# Patient Record
Sex: FEMALE | Race: WHITE | Employment: STUDENT | URBAN - NONMETROPOLITAN AREA
[De-identification: names, ages, dates, MRNs, and addresses within clinical notes are randomized per-mention and may not be internally consistent; named-entity substitution may affect disease eponyms.]

---

## 2019-06-14 ENCOUNTER — OFFICE VISIT (OUTPATIENT)
Dept: FAMILY MEDICINE CLINIC | Facility: CLINIC | Age: 19
End: 2019-06-14
Payer: COMMERCIAL

## 2019-06-14 VITALS
TEMPERATURE: 100 F | HEART RATE: 114 BPM | RESPIRATION RATE: 16 BRPM | HEIGHT: 65 IN | BODY MASS INDEX: 18.87 KG/M2 | OXYGEN SATURATION: 99 % | SYSTOLIC BLOOD PRESSURE: 112 MMHG | WEIGHT: 113.25 LBS | DIASTOLIC BLOOD PRESSURE: 72 MMHG

## 2019-06-14 DIAGNOSIS — J01.00 ACUTE NON-RECURRENT MAXILLARY SINUSITIS: ICD-10-CM

## 2019-06-14 DIAGNOSIS — R00.0 TACHYCARDIA: ICD-10-CM

## 2019-06-14 DIAGNOSIS — R50.9 FEVER, UNSPECIFIED FEVER CAUSE: ICD-10-CM

## 2019-06-14 DIAGNOSIS — R00.2 PALPITATION: ICD-10-CM

## 2019-06-14 DIAGNOSIS — H10.33 ACUTE CONJUNCTIVITIS OF BOTH EYES, UNSPECIFIED ACUTE CONJUNCTIVITIS TYPE: Primary | ICD-10-CM

## 2019-06-14 PROCEDURE — 36415 COLL VENOUS BLD VENIPUNCTURE: CPT | Performed by: NURSE PRACTITIONER

## 2019-06-14 PROCEDURE — 99204 OFFICE O/P NEW MOD 45 MIN: CPT | Performed by: NURSE PRACTITIONER

## 2019-06-14 PROCEDURE — 80050 GENERAL HEALTH PANEL: CPT | Performed by: NURSE PRACTITIONER

## 2019-06-14 PROCEDURE — 86308 HETEROPHILE ANTIBODY SCREEN: CPT | Performed by: NURSE PRACTITIONER

## 2019-06-14 PROCEDURE — 93000 ELECTROCARDIOGRAM COMPLETE: CPT | Performed by: NURSE PRACTITIONER

## 2019-06-14 RX ORDER — NORETHINDRONE ACETATE, ETHINYL ESTRADIOL AND FERROUS FUMARATE 1MG-20(24)
1 KIT ORAL DAILY
COMMUNITY
Start: 2019-05-25 | End: 2021-09-22

## 2019-06-14 RX ORDER — PREDNISONE 20 MG/1
20 TABLET ORAL 2 TIMES DAILY
Qty: 10 TABLET | Refills: 0 | Status: SHIPPED | OUTPATIENT
Start: 2019-06-14 | End: 2019-06-19

## 2019-06-14 RX ORDER — AZITHROMYCIN 250 MG/1
TABLET, FILM COATED ORAL
Qty: 6 TABLET | Refills: 0 | Status: SHIPPED | OUTPATIENT
Start: 2019-06-14 | End: 2020-01-07 | Stop reason: ALTCHOICE

## 2019-06-14 NOTE — PROGRESS NOTES
HPI:   Sore Throat    This is a new problem. Episode onset: 3-4 weeks ago. The problem has been waxing and waning (had a cold initially, then got strep throat, then a yeast infection from the abx, now her eyes are bothering her). There has been no fever. Negative for neck pain. Neurological: Positive for light-headedness and headaches (she gets a lot of them anyway). Psychiatric/Behavioral: The patient is nervous/anxious (chronic anxiety).          EXAM:   /72   Pulse 114   Temp 99.5 °F (37.5 °C) ELECTROCARDIOGRAM, COMPLETE  -     CBC W/ DIFFERENTIAL    Fever, unspecified fever cause  -     MONONUCLEOSIS TEST,SCREEN (IN-HOUSE)    EKG sinus tachycardia, no arrhthymia. Likely related to fever and infection.

## 2020-01-07 ENCOUNTER — OFFICE VISIT (OUTPATIENT)
Dept: FAMILY MEDICINE CLINIC | Facility: CLINIC | Age: 20
End: 2020-01-07
Payer: COMMERCIAL

## 2020-01-07 VITALS
HEIGHT: 65 IN | DIASTOLIC BLOOD PRESSURE: 74 MMHG | TEMPERATURE: 97 F | OXYGEN SATURATION: 98 % | SYSTOLIC BLOOD PRESSURE: 110 MMHG | WEIGHT: 130 LBS | HEART RATE: 104 BPM | BODY MASS INDEX: 21.66 KG/M2

## 2020-01-07 DIAGNOSIS — G44.209 TENSION HEADACHE: ICD-10-CM

## 2020-01-07 DIAGNOSIS — R23.9 UNSPECIFIED SKIN CHANGES: ICD-10-CM

## 2020-01-07 DIAGNOSIS — G43.009 MIGRAINE WITHOUT AURA AND WITHOUT STATUS MIGRAINOSUS, NOT INTRACTABLE: Primary | ICD-10-CM

## 2020-01-07 DIAGNOSIS — F43.22 ADJUSTMENT DISORDER WITH ANXIOUS MOOD: ICD-10-CM

## 2020-01-07 PROCEDURE — 99204 OFFICE O/P NEW MOD 45 MIN: CPT | Performed by: FAMILY MEDICINE

## 2020-01-07 RX ORDER — SUMATRIPTAN 25 MG/1
25 TABLET, FILM COATED ORAL EVERY 2 HOUR PRN
Qty: 10 TABLET | Refills: 1 | Status: SHIPPED | OUTPATIENT
Start: 2020-01-07 | End: 2021-09-22

## 2020-01-07 NOTE — PATIENT INSTRUCTIONS
A total of 30 minutes was spent with the patient discussing the following. Anticipatory guidance provided as well as positive reinforcement. I discussed headaches in general and migraine versus tension headaches in particular.   I advised patient feels th

## 2020-01-07 NOTE — PROGRESS NOTES
HPI:    Patient ID: Ray Menjivar is a 22year old female.     Patient presents with:  Headache: chronic, discuss plus other issues    May have daily HAs for a week at a time, no relief from aleve  Daily HAs behind the eyes and head and neck  Other HAs confusion, decreased concentration, hallucinations, self-injury, sleep disturbance and suicidal ideas. The patient is nervous/anxious. The patient is not hyperactive.              Current Outpatient Medications   Medication Sig Dispense Refill   • SUMAtript lb (59 kg), SpO2 98 %.          ASSESSMENT/PLAN:   Migraine without aura and without status migrainosus, not intractable  (primary encounter diagnosis)  Tension headache  Adjustment disorder with anxious mood  Unspecified skin changes-vulvar  Patient Instru

## 2021-06-25 ENCOUNTER — OFFICE VISIT (OUTPATIENT)
Dept: FAMILY MEDICINE CLINIC | Facility: CLINIC | Age: 21
End: 2021-06-25
Payer: COMMERCIAL

## 2021-06-25 VITALS
TEMPERATURE: 98 F | DIASTOLIC BLOOD PRESSURE: 76 MMHG | RESPIRATION RATE: 14 BRPM | WEIGHT: 119 LBS | HEART RATE: 108 BPM | OXYGEN SATURATION: 97 % | SYSTOLIC BLOOD PRESSURE: 120 MMHG | BODY MASS INDEX: 19.83 KG/M2 | HEIGHT: 65 IN

## 2021-06-25 DIAGNOSIS — Z86.69 HISTORY OF MIGRAINE: ICD-10-CM

## 2021-06-25 DIAGNOSIS — G50.0 TRIGEMINAL NEURALGIA: Primary | ICD-10-CM

## 2021-06-25 PROCEDURE — 3078F DIAST BP <80 MM HG: CPT | Performed by: FAMILY MEDICINE

## 2021-06-25 PROCEDURE — 3008F BODY MASS INDEX DOCD: CPT | Performed by: FAMILY MEDICINE

## 2021-06-25 PROCEDURE — 3074F SYST BP LT 130 MM HG: CPT | Performed by: FAMILY MEDICINE

## 2021-06-25 PROCEDURE — 99213 OFFICE O/P EST LOW 20 MIN: CPT | Performed by: FAMILY MEDICINE

## 2021-06-25 NOTE — PROGRESS NOTES
Ike Quinonez is a 19year old female. Patient presents with:  Pain: intermit; one side or other of face; couple times a week; going on for years      HPI:   C/o intermittent facial pain on different sides, pain is in a trigeminal nerve distribution, n good mobility, Nose: turbinates clear, no dc, Throat: no erythema, pnd, or lesions  NECK: supple,no adenopathy,no bruits, no thyromegaly  LUNGS: clear to auscultation, no w/r/r  CARDIO: RRR without murmur, peripheral pulses intact  GI: good BS's,no masses,

## 2021-06-25 NOTE — PATIENT INSTRUCTIONS
I discussed the diagnosis of trigeminal neuralgia and possible contributing factors. We will start carbamazepine 100 mg daily and may increase to 100 mg twice daily as needed. I have asked patient to keep a symptom diary.   If there is no improvement with

## 2021-06-29 ENCOUNTER — TELEPHONE (OUTPATIENT)
Dept: FAMILY MEDICINE CLINIC | Facility: CLINIC | Age: 21
End: 2021-06-29

## 2021-06-29 RX ORDER — CARBAMAZEPINE 100 MG/1
100 TABLET, CHEWABLE ORAL DAILY
Qty: 30 TABLET | Refills: 0 | Status: SHIPPED | OUTPATIENT
Start: 2021-06-29 | End: 2021-07-15 | Stop reason: DRUGHIGH

## 2021-06-29 NOTE — TELEPHONE ENCOUNTER
Per 6/25/21 ofc note----we will start carbamazepine 100mg daily and may increase to 100mg BID as needed. \"    Script wasn't sent to pharmacy. IT APPEARS THAT THE 100MG DOSE ONLY COMES IN CHEW TABS??  Okay to send?
Please fill prescription as noted, please offer my apologies
Pt advised that script has been sent
Pt. Thought at last appt. Dr. Justin Langley was going to be calling in some anti-seizure meds. Nothing called in.
Sanam SARGENT

## 2021-07-14 ENCOUNTER — TELEPHONE (OUTPATIENT)
Dept: FAMILY MEDICINE CLINIC | Facility: CLINIC | Age: 21
End: 2021-07-14

## 2021-07-14 DIAGNOSIS — G50.0 TRIGEMINAL NEURALGIA: Primary | ICD-10-CM

## 2021-07-14 NOTE — TELEPHONE ENCOUNTER
Patient called to inform the doctor that her medication is not working. She is asking for a call back to this #. [phone].

## 2021-07-14 NOTE — TELEPHONE ENCOUNTER
Spoke with pt. Was seen on 6/25/21 for trigeminal neuralgia---calling with an update. Has been taking carbamazepine 100mg qam since then w/o relief. States pain has been worse since her appt.   Ofc note says she can increase to 100mg BID if needed, but she

## 2021-07-14 NOTE — TELEPHONE ENCOUNTER
I recommend the patient increase the carbamazepine 200 mg twice daily. If no response in 1 week increased to 200 mg twice daily  I would also like her to get a MRI /MRA of the brain.   This can be done at one of the [address] facilities or through [address]

## 2021-07-15 RX ORDER — CARBAMAZEPINE 200 MG/1
200 TABLET ORAL 2 TIMES DAILY
Qty: 60 TABLET | Refills: 0 | Status: SHIPPED | OUTPATIENT
Start: 2021-07-15 | End: 2021-08-11

## 2021-07-22 ENCOUNTER — TELEPHONE (OUTPATIENT)
Dept: FAMILY MEDICINE CLINIC | Facility: CLINIC | Age: 21
End: 2021-07-22

## 2021-07-22 DIAGNOSIS — G50.0 TRIGEMINAL NEURALGIA: Primary | ICD-10-CM

## 2021-07-22 NOTE — TELEPHONE ENCOUNTER
MARLINEI---Pt calls with an update after increasing her carbamazepine to 200mg BID on 7/14. States she has had 2 days w/o any pain. The other days, the pain has been about the same. Order for MRI/ MRA was placed on 7/14.   PT WILL CALL CENTRAL SCHEDULING TOD

## 2021-07-23 ENCOUNTER — PATIENT MESSAGE (OUTPATIENT)
Dept: FAMILY MEDICINE CLINIC | Facility: CLINIC | Age: 21
End: 2021-07-23

## 2021-07-23 RX ORDER — CARBAMAZEPINE 100 MG/1
100 TABLET, CHEWABLE ORAL DAILY
Qty: 30 TABLET | Refills: 0 | OUTPATIENT
Start: 2021-07-23

## 2021-07-23 NOTE — TELEPHONE ENCOUNTER
Patient may increase her carbamazepine to 300 mg twice daily. I would recommend neurology opinion from Dr. Lena Jackson all.   Please facilitate appointment

## 2021-07-24 NOTE — TELEPHONE ENCOUNTER
From: Gilbert Kim Eager  To: Shelbie Brewer.  Gemma Elaine DO  Sent: 7/23/2021 8:30 PM CDT  Subject: Prescription Question    Hi Dr. Gemma Elaine,  I just wanted to reach out to you and let you know that my lymph node on the left side of my neck has been swollen and se

## 2021-08-04 ENCOUNTER — HOSPITAL ENCOUNTER (OUTPATIENT)
Dept: MRI IMAGING | Facility: HOSPITAL | Age: 21
Discharge: HOME | End: 2021-08-04
Attending: FAMILY MEDICINE
Payer: COMMERCIAL

## 2021-08-04 DIAGNOSIS — G50.0 TRIGEMINAL NEURALGIA: ICD-10-CM

## 2021-08-04 PROCEDURE — 70544 MR ANGIOGRAPHY HEAD W/O DYE: CPT | Performed by: FAMILY MEDICINE

## 2021-08-04 PROCEDURE — 70551 MRI BRAIN STEM W/O DYE: CPT | Performed by: FAMILY MEDICINE

## 2021-08-11 RX ORDER — CARBAMAZEPINE 200 MG/1
TABLET ORAL
Qty: 60 TABLET | Refills: 0 | Status: SHIPPED | OUTPATIENT
Start: 2021-08-11 | End: 2021-09-22

## 2021-08-11 NOTE — TELEPHONE ENCOUNTER
Last refill: 7/15/21 #60 w/ 0 refills    Last OV: 6/25/21  Last labs: 2019    Future Appointments   Date Time Provider Renuka Bryant   9/15/2021  1:40 PM MD JULIETH Hilton

## 2021-09-15 ENCOUNTER — OFFICE VISIT (OUTPATIENT)
Dept: NEUROLOGY | Facility: CLINIC | Age: 21
End: 2021-09-15
Payer: COMMERCIAL

## 2021-09-15 VITALS
DIASTOLIC BLOOD PRESSURE: 78 MMHG | BODY MASS INDEX: 20 KG/M2 | RESPIRATION RATE: 17 BRPM | HEART RATE: 91 BPM | SYSTOLIC BLOOD PRESSURE: 128 MMHG | WEIGHT: 118 LBS

## 2021-09-15 DIAGNOSIS — G43.009 MIGRAINE WITHOUT AURA AND WITHOUT STATUS MIGRAINOSUS, NOT INTRACTABLE: ICD-10-CM

## 2021-09-15 DIAGNOSIS — G44.031 INTRACTABLE EPISODIC PAROXYSMAL HEMICRANIA: Primary | ICD-10-CM

## 2021-09-15 PROCEDURE — 99243 OFF/OP CNSLTJ NEW/EST LOW 30: CPT | Performed by: OTHER

## 2021-09-15 PROCEDURE — 3074F SYST BP LT 130 MM HG: CPT | Performed by: OTHER

## 2021-09-15 PROCEDURE — 3078F DIAST BP <80 MM HG: CPT | Performed by: OTHER

## 2021-09-15 RX ORDER — VERAPAMIL HYDROCHLORIDE 40 MG/1
40 TABLET ORAL 2 TIMES DAILY
Qty: 60 TABLET | Refills: 3 | Status: SHIPPED | OUTPATIENT
Start: 2021-09-15 | End: 2022-01-25

## 2021-09-15 RX ORDER — INDOMETHACIN 25 MG/1
25 CAPSULE ORAL 2 TIMES DAILY WITH MEALS
Qty: 20 CAPSULE | Refills: 5 | Status: SHIPPED | OUTPATIENT
Start: 2021-09-15 | End: 2021-11-24

## 2021-09-15 RX ORDER — UBROGEPANT 100 MG/1
TABLET ORAL
Qty: 10 TABLET | Refills: 0 | Status: SHIPPED | OUTPATIENT
Start: 2021-09-15 | End: 2021-11-03

## 2021-09-15 RX ORDER — VERAPAMIL HYDROCHLORIDE 40 MG/1
40 TABLET ORAL 2 TIMES DAILY
Qty: 60 TABLET | Refills: 0 | Status: SHIPPED | OUTPATIENT
Start: 2021-09-15 | End: 2021-09-15

## 2021-09-15 NOTE — PATIENT INSTRUCTIONS
After your visit at the [facility] office  today,  please direct any follow up questions or medication needs to the staff in our  [place] office so that your concerns may be promptly addressed.   We are available through [employer] or at the numbers below: picked up in office. • Please allow the office 2-3 business days to fill the prescription. • Patient must present photo ID at time of . PLEASE NOTE: PRESCRIPTIONS MUST BE PICKED UP PRIOR TO 3:00PM MONDAY-FRIDAY    Scheduling Tests:     If your ph submitting forms to office staff. • Form completion may require an additional fee. • A signed Release of Information (MIRANDA) must be on file before forms may be submitted. When dropping off forms, please ask the  for this paper.    • Failure

## 2021-09-15 NOTE — PROGRESS NOTES
[address]   Neurology; INITIAL CLINIC VISIT  9/15/2021, 2:15 PM     Alonzo Castellanos Patient Status:  No patient class for patient encounter    2000 MRN [id number]   Location [address] [place] [address] Attending No att. Daniela Blanton HISTORY:  Past Medical History:   Diagnosis Date   • Anxiety        PAST SURGICAL HISTORY:  History reviewed. No pertinent surgical history.     FAMILY HISTORY:  family history includes Hypertension in her father; Migraines in her father; No Known Problems sensory focal deficit  Gait and coordination OK  Reflexes symmetric and no pathologic reflexes seen          Special Test:    DIAGNOSTIC DATA:   IMAGING:  I reviewed the MRI images with the patient and pointed out that the white matter changes were noted l the information available to me at this time and supersedes any prior opinion expressed either orally or in writing.   Services rendered are only within the scope of direct medical care

## 2021-09-15 NOTE — PROGRESS NOTES
Patient reports she feels she has had TGN pain for a while, but it worsened approximately 2 months ago. Carbamazepine has decreased intensity of pain and it does not last as long, but she continues to have pain 50% of the month.

## 2021-09-22 ENCOUNTER — OFFICE VISIT (OUTPATIENT)
Dept: FAMILY MEDICINE CLINIC | Facility: CLINIC | Age: 21
End: 2021-09-22
Payer: COMMERCIAL

## 2021-09-22 VITALS
RESPIRATION RATE: 16 BRPM | HEIGHT: 65 IN | DIASTOLIC BLOOD PRESSURE: 76 MMHG | HEART RATE: 95 BPM | TEMPERATURE: 99 F | SYSTOLIC BLOOD PRESSURE: 128 MMHG | BODY MASS INDEX: 20.01 KG/M2 | WEIGHT: 120.13 LBS | OXYGEN SATURATION: 98 %

## 2021-09-22 DIAGNOSIS — Z00.00 WELL ADULT EXAM: Primary | ICD-10-CM

## 2021-09-22 PROCEDURE — 88175 CYTOPATH C/V AUTO FLUID REDO: CPT | Performed by: INTERNAL MEDICINE

## 2021-09-22 PROCEDURE — 3078F DIAST BP <80 MM HG: CPT | Performed by: INTERNAL MEDICINE

## 2021-09-22 PROCEDURE — 3008F BODY MASS INDEX DOCD: CPT | Performed by: INTERNAL MEDICINE

## 2021-09-22 PROCEDURE — 3074F SYST BP LT 130 MM HG: CPT | Performed by: INTERNAL MEDICINE

## 2021-09-22 PROCEDURE — 99395 PREV VISIT EST AGE 18-39: CPT | Performed by: INTERNAL MEDICINE

## 2021-09-22 RX ORDER — NORETHINDRONE ACETATE AND ETHINYL ESTRADIOL 1MG-20(21)
1 KIT ORAL DAILY
Qty: 3 EACH | Refills: 3 | Status: SHIPPED | OUTPATIENT
Start: 2021-09-22 | End: 2021-10-05

## 2021-09-22 NOTE — PROGRESS NOTES
HPI:   William Etienne is a 19year old female who presents for a complete physical exam. Symptoms: denies discharge, itching, burning or dysuria, periods are irregular. Patient complains of irregular periods, had a long interval and then 2 short. used    Alcohol use: Yes      Comment: rare    Drug use: Never    Occ: full time. : yes. Children: no.   Exercise: 7 times per week.   Diet: vegetarian     REVIEW OF SYSTEMS:   GENERAL: feels well otherwise  SKIN: denies any unusual skin lesions  EYE 19.99 kg/m². , recommended low fat diet and aerobic exercise 30 minutes three times weekly. The patient indicates understanding of these issues and agrees to the plan. The patient is asked to return for CPX in 1 year.

## 2021-10-05 ENCOUNTER — PATIENT MESSAGE (OUTPATIENT)
Dept: FAMILY MEDICINE CLINIC | Facility: CLINIC | Age: 21
End: 2021-10-05

## 2021-10-05 RX ORDER — NORETHINDRONE ACETATE AND ETHINYL ESTRADIOL 1MG-20(21)
1 KIT ORAL DAILY
Qty: 28 TABLET | Refills: 2 | Status: SHIPPED | OUTPATIENT
Start: 2021-10-05 | End: 2021-10-28

## 2021-10-05 NOTE — TELEPHONE ENCOUNTER
From: Phoenix Alston Eager  To: Eleni Holguin MD  Sent: 10/5/2021 3:19 PM CDT  Subject: Birth control prescription problem     Hi Dr. Kirsten Rodriguez,  I see you refilled my prescription on the 22nd.  However, I haven't received it yet, and I am supposed to start [place]

## 2021-10-20 ENCOUNTER — TELEPHONE (OUTPATIENT)
Dept: NEUROLOGY | Facility: CLINIC | Age: 21
End: 2021-10-20

## 2021-10-28 RX ORDER — NORETHINDRONE ACETATE AND ETHINYL ESTRADIOL AND FERROUS FUMARATE 1MG-20(21)
KIT ORAL
Qty: 28 TABLET | Refills: 2 | Status: SHIPPED | OUTPATIENT
Start: 2021-10-28 | End: 2022-01-24

## 2021-10-28 NOTE — TELEPHONE ENCOUNTER
LOV  09/22/2021    LAST LAB  09/22/2021    LAST RX  10/05/2021    Next OV    Future Appointments   Date Time Provider Renuka Bryant   11/24/2021 11:40 AM [facility] [address]    Name from pharmacy: Emilie Sal
40

## 2021-11-03 ENCOUNTER — PATIENT MESSAGE (OUTPATIENT)
Dept: NEUROLOGY | Facility: CLINIC | Age: 21
End: 2021-11-03

## 2021-11-03 DIAGNOSIS — G43.009 MIGRAINE WITHOUT AURA AND WITHOUT STATUS MIGRAINOSUS, NOT INTRACTABLE: ICD-10-CM

## 2021-11-03 DIAGNOSIS — G44.031 INTRACTABLE EPISODIC PAROXYSMAL HEMICRANIA: Primary | ICD-10-CM

## 2021-11-03 RX ORDER — UBROGEPANT 100 MG/1
TABLET ORAL
Qty: 10 TABLET | Refills: 0 | Status: SHIPPED | OUTPATIENT
Start: 2021-11-03 | End: 2021-12-01

## 2021-11-03 NOTE — TELEPHONE ENCOUNTER
Patient states [employer] is working for her, would like refill. Has two pills left.     Medication: ubrogepant (UBRELVY) 100 MG Oral Tab     Date of last refill: 9/16/2021 (#10/0)  [place]     Last office visit: 9/15/20

## 2021-11-03 NOTE — TELEPHONE ENCOUNTER
From: Kourtney Alatorre Eager  To: Angie Palacios MD  Sent: 11/3/2021 2:46 PM CDT  Subject: [facility] refill    Hi Dr. Justice Lindsey,  I wanted to reach out to see if I could get a refill of the [facility] prescription. It has been working, but I am down to 2 pills.

## 2021-11-24 ENCOUNTER — OFFICE VISIT (OUTPATIENT)
Dept: NEUROLOGY | Facility: CLINIC | Age: 21
End: 2021-11-24
Payer: COMMERCIAL

## 2021-11-24 VITALS
HEIGHT: 65 IN | WEIGHT: 120 LBS | HEART RATE: 66 BPM | RESPIRATION RATE: 16 BRPM | SYSTOLIC BLOOD PRESSURE: 118 MMHG | DIASTOLIC BLOOD PRESSURE: 78 MMHG | BODY MASS INDEX: 19.99 KG/M2

## 2021-11-24 DIAGNOSIS — G44.031 INTRACTABLE EPISODIC PAROXYSMAL HEMICRANIA: Primary | ICD-10-CM

## 2021-11-24 DIAGNOSIS — G43.009 MIGRAINE WITHOUT AURA AND WITHOUT STATUS MIGRAINOSUS, NOT INTRACTABLE: ICD-10-CM

## 2021-11-24 PROCEDURE — 99214 OFFICE O/P EST MOD 30 MIN: CPT | Performed by: OTHER

## 2021-11-24 PROCEDURE — 3074F SYST BP LT 130 MM HG: CPT | Performed by: OTHER

## 2021-11-24 PROCEDURE — 3078F DIAST BP <80 MM HG: CPT | Performed by: OTHER

## 2021-11-24 PROCEDURE — 3008F BODY MASS INDEX DOCD: CPT | Performed by: OTHER

## 2021-11-24 RX ORDER — DIVALPROEX SODIUM 250 MG/1
TABLET, EXTENDED RELEASE ORAL
Qty: 30 TABLET | Refills: 5 | Status: SHIPPED | OUTPATIENT
Start: 2021-11-24 | End: 2021-12-14

## 2021-11-24 RX ORDER — INDOMETHACIN 50 MG/1
CAPSULE ORAL
Qty: 1 CAPSULE | Refills: 0 | Status: SHIPPED | OUTPATIENT
Start: 2021-11-24 | End: 2021-11-29

## 2021-11-24 NOTE — PROGRESS NOTES
[facility] with [address]  1/30/2000  Primary Care Provider:  Stacy Springer.  Antolin Bernabe DO    11/24/2021  Accompanied visit:     (x) No.      19year old yo patient being seen for: exam      INTERPRETATION of RELEVANT LABS and other DATA:          Problem/s Identified this visit:   Intractable episodic paroxysmal hemicrania  (primary encounter diagnosis)  Migraine without aura and without status migrainosus, not intractable      Disc the scope of direct medical care

## 2021-11-29 RX ORDER — INDOMETHACIN 50 MG/1
CAPSULE ORAL
Qty: 60 CAPSULE | Refills: 1 | Status: SHIPPED | OUTPATIENT
Start: 2021-11-29

## 2021-12-01 DIAGNOSIS — G43.009 MIGRAINE WITHOUT AURA AND WITHOUT STATUS MIGRAINOSUS, NOT INTRACTABLE: ICD-10-CM

## 2021-12-01 DIAGNOSIS — G44.031 INTRACTABLE EPISODIC PAROXYSMAL HEMICRANIA: ICD-10-CM

## 2021-12-01 RX ORDER — UBROGEPANT 100 MG/1
TABLET ORAL
Qty: 10 TABLET | Refills: 0 | Status: SHIPPED | OUTPATIENT
Start: 2021-12-01 | End: 2021-12-31

## 2021-12-01 NOTE — TELEPHONE ENCOUNTER
Medication: ubrelvy 100mg     Date of last refill: 11/3/21 (#10/0R)  [place]     Last office visit: 11/24/21  Due back to clinic per last office note:  4 mon  Date next office visit scheduled:    Future Appointments

## 2021-12-06 ENCOUNTER — TELEPHONE (OUTPATIENT)
Dept: NEUROLOGY | Facility: CLINIC | Age: 21
End: 2021-12-06

## 2021-12-06 ENCOUNTER — PATIENT MESSAGE (OUTPATIENT)
Dept: NEUROLOGY | Facility: CLINIC | Age: 21
End: 2021-12-06

## 2021-12-06 DIAGNOSIS — R11.0 NAUSEA: Primary | ICD-10-CM

## 2021-12-06 NOTE — TELEPHONE ENCOUNTER
increase the Depakote to 500 mg at bedtime  I'll send a new script for the higher dose but you can double your current dose at night    [address]

## 2021-12-06 NOTE — TELEPHONE ENCOUNTER
From: Susan Alvarez Eager  To: Eren Page MD  Sent: 12/6/2021  2:06 PM CST  Subject: Headache frequency update    Hi Dr. Victoria Rico,  I hope you had a restful and enjoyable thanksgiving.    Just wanted to let you know I have had a headache every

## 2021-12-06 NOTE — TELEPHONE ENCOUNTER
From: Otis Nix Eager  To: Lucia Song MD  Sent: 12/6/2021 2:06 PM CST  Subject: Headache frequency update    Hi Dr. Jane Murrell,  I hope you had a restful and enjoyable thanksgiving.    Just wanted to let you know I have had a headache everyday sin

## 2021-12-07 RX ORDER — ONDANSETRON 4 MG/1
4 TABLET, FILM COATED ORAL EVERY 8 HOURS PRN
Qty: 14 TABLET | Refills: 0 | Status: SHIPPED | OUTPATIENT
Start: 2021-12-07

## 2021-12-07 NOTE — TELEPHONE ENCOUNTER
LMTCb  Tell her to increase Depakote to 500 mg HS  Augment the Ubrelvy with intake of Zofran with first dose of the day    Missy Patel MD   [facility]

## 2021-12-15 RX ORDER — DIVALPROEX SODIUM 250 MG/1
500 TABLET, EXTENDED RELEASE ORAL NIGHTLY
Qty: 60 TABLET | Refills: 5 | Status: SHIPPED | OUTPATIENT
Start: 2021-12-15

## 2021-12-15 NOTE — TELEPHONE ENCOUNTER
Medication: divalproex sodium ER 250mg     Date of last refill: 12/7/21 (#14/0R)  [place]     Last office visit: 11/24/21  Due back to clinic per last office note:  Margaret Yang  Date next office visit scheduled:    Future A

## 2022-01-24 RX ORDER — NORETHINDRONE ACETATE AND ETHINYL ESTRADIOL AND FERROUS FUMARATE 1MG-20(21)
KIT ORAL
Qty: 28 TABLET | Refills: 2 | Status: SHIPPED | OUTPATIENT
Start: 2022-01-24

## 2022-01-24 NOTE — TELEPHONE ENCOUNTER
Gynecology Medication Protocol Passed 01/23/2022 12:15 AM    PASS-PENDING LAST PAP WNL--VIA MANUAL LOOKUP    Physical or Pelvic/Breast in past 12 or next 3 mos--VIA MANUAL LOOKUP     Last refill - 10/28/21 - #28 with 2 reills  Last pap - 9/22/21   Last phy

## 2022-01-25 RX ORDER — VERAPAMIL HYDROCHLORIDE 40 MG/1
TABLET ORAL
Qty: 180 TABLET | Refills: 1 | Status: SHIPPED | OUTPATIENT
Start: 2022-01-25 | End: 2022-01-26

## 2022-01-25 NOTE — TELEPHONE ENCOUNTER
Medication: Verapamil 40 mg     Date of last refill: 09/15/21 with 3 addt refills  [place]     Last office visit: 11/24/21  Due back to clinic per last office note:  4mon  Date next office visit scheduled:

## 2022-02-03 RX ORDER — DIVALPROEX SODIUM 250 MG/1
500 TABLET, EXTENDED RELEASE ORAL NIGHTLY
Qty: 60 TABLET | Refills: 5 | OUTPATIENT
Start: 2022-02-03

## 2022-02-03 NOTE — TELEPHONE ENCOUNTER
Divalproex  mg refilled on 12/15/21 #60/5R to requesting pharmacy  Spoke with pharmacy tech, verified refills on file.     RN, please refuse as refill not currently appropriate    Pt notified via [address]

## 2022-02-21 RX ORDER — UBROGEPANT 100 MG/1
TABLET ORAL
Qty: 10 TABLET | Refills: 0 | Status: SHIPPED | OUTPATIENT
Start: 2022-02-21

## 2022-04-16 RX ORDER — NORETHINDRONE ACETATE AND ETHINYL ESTRADIOL AND FERROUS FUMARATE 1MG-20(21)
KIT ORAL
Qty: 28 TABLET | Refills: 2 | Status: SHIPPED | OUTPATIENT
Start: 2022-04-16

## 2022-04-16 NOTE — TELEPHONE ENCOUNTER
LOV 09/22/2021    LAST LAB 2019    LAST RX  Pippa Serrano FE #28 R2 01/24/2022    Next OV   Future Appointments   Date Time Provider Renuka Bryant   5/25/2022  3:00 PM MD IDA FlorIALLEN [facility] [address]   Gynecology Medication Protocol Passed 04/16/2022 12:16 AM    PASS-PENDING LAST PAP WNL--VIA MANUAL LOOKUP    Physical or Pelvic/Breast in past 12 or next 3 mos--VIA MANUAL LOOKUP

## 2022-05-02 RX ORDER — UBROGEPANT 100 MG/1
1 TABLET ORAL AS NEEDED
Qty: 10 TABLET | Refills: 5 | Status: SHIPPED | OUTPATIENT
Start: 2022-05-02

## 2022-05-25 ENCOUNTER — OFFICE VISIT (OUTPATIENT)
Dept: NEUROLOGY | Facility: CLINIC | Age: 22
End: 2022-05-25
Payer: COMMERCIAL

## 2022-05-25 VITALS — RESPIRATION RATE: 16 BRPM | HEIGHT: 65 IN | WEIGHT: 120 LBS | BODY MASS INDEX: 19.99 KG/M2

## 2022-05-25 DIAGNOSIS — R11.0 NAUSEA: ICD-10-CM

## 2022-05-25 DIAGNOSIS — G44.031 INTRACTABLE EPISODIC PAROXYSMAL HEMICRANIA: ICD-10-CM

## 2022-05-25 DIAGNOSIS — G43.009 MIGRAINE WITHOUT AURA AND WITHOUT STATUS MIGRAINOSUS, NOT INTRACTABLE: Primary | ICD-10-CM

## 2022-05-25 PROCEDURE — 3008F BODY MASS INDEX DOCD: CPT | Performed by: OTHER

## 2022-05-25 PROCEDURE — 99214 OFFICE O/P EST MOD 30 MIN: CPT | Performed by: OTHER

## 2022-05-25 RX ORDER — DIVALPROEX SODIUM 250 MG/1
500 TABLET, EXTENDED RELEASE ORAL NIGHTLY
Qty: 60 TABLET | Refills: 5 | Status: SHIPPED | OUTPATIENT
Start: 2022-05-25

## 2022-05-25 RX ORDER — ONDANSETRON 4 MG/1
4 TABLET, FILM COATED ORAL EVERY 8 HOURS PRN
Qty: 14 TABLET | Refills: 0 | Status: SHIPPED | OUTPATIENT
Start: 2022-05-25

## 2022-05-25 RX ORDER — INDOMETHACIN 50 MG/1
CAPSULE ORAL
Qty: 60 CAPSULE | Refills: 1 | Status: SHIPPED | OUTPATIENT
Start: 2022-05-25

## 2022-07-05 RX ORDER — NORETHINDRONE ACETATE AND ETHINYL ESTRADIOL AND FERROUS FUMARATE 1MG-20(21)
KIT ORAL
Qty: 28 TABLET | Refills: 2 | Status: SHIPPED | OUTPATIENT
Start: 2022-07-05

## 2022-07-05 NOTE — TELEPHONE ENCOUNTER
Gynecology Medication Protocol Passed 07/03/2022 08:33 AM    PASS-PENDING LAST PAP WNL--VIA MANUAL LOOKUP    Physical or Pelvic/Breast in past 12 or next 3 mos--VIA MANUAL LOOKUP     Last refill - 4/16/22 - #28 with 2 refills  Last office visit - 9/22/21  Last pap - 9/22/21  Refilled per protocol

## 2022-07-08 DIAGNOSIS — G43.009 MIGRAINE WITHOUT AURA AND WITHOUT STATUS MIGRAINOSUS, NOT INTRACTABLE: Primary | ICD-10-CM

## 2022-07-08 DIAGNOSIS — G44.031 INTRACTABLE EPISODIC PAROXYSMAL HEMICRANIA: ICD-10-CM

## 2022-07-08 RX ORDER — DIVALPROEX SODIUM 500 MG/1
500 TABLET, EXTENDED RELEASE ORAL DAILY
Qty: 30 TABLET | Refills: 4 | Status: SHIPPED | OUTPATIENT
Start: 2022-07-08

## 2022-08-22 RX ORDER — INDOMETHACIN 50 MG/1
CAPSULE ORAL
Qty: 60 CAPSULE | Refills: 5 | Status: SHIPPED | OUTPATIENT
Start: 2022-08-22

## 2022-09-12 DIAGNOSIS — G44.031 INTRACTABLE EPISODIC PAROXYSMAL HEMICRANIA: ICD-10-CM

## 2022-09-12 DIAGNOSIS — G43.009 MIGRAINE WITHOUT AURA AND WITHOUT STATUS MIGRAINOSUS, NOT INTRACTABLE: ICD-10-CM

## 2022-09-12 RX ORDER — UBROGEPANT 100 MG/1
1 TABLET ORAL AS NEEDED
Qty: 10 TABLET | Refills: 5 | Status: SHIPPED | OUTPATIENT
Start: 2022-09-12

## 2022-09-27 DIAGNOSIS — G43.009 MIGRAINE WITHOUT AURA AND WITHOUT STATUS MIGRAINOSUS, NOT INTRACTABLE: ICD-10-CM

## 2022-09-27 DIAGNOSIS — G44.031 INTRACTABLE EPISODIC PAROXYSMAL HEMICRANIA: ICD-10-CM

## 2022-09-27 RX ORDER — DIVALPROEX SODIUM 500 MG/1
500 TABLET, EXTENDED RELEASE ORAL DAILY
Qty: 90 TABLET | Refills: 1 | Status: SHIPPED | OUTPATIENT
Start: 2022-09-27

## 2022-09-28 RX ORDER — NORETHINDRONE ACETATE AND ETHINYL ESTRADIOL AND FERROUS FUMARATE 1MG-20(21)
KIT ORAL
Qty: 28 TABLET | Refills: 2 | Status: SHIPPED | OUTPATIENT
Start: 2022-09-28

## 2022-09-28 NOTE — TELEPHONE ENCOUNTER
Gynecology Medication Protocol Failed 09/28/2022 04:10 AM    Physical or Pelvic/Breast in past 12 or next 3 mos--VIA MANUAL LOOKUP    PASS-PENDING LAST PAP WNL--VIA MANUAL LOOKUP        Last office visit: 09/22/21  Last refill:  07/05/22  #28, 2 refills   Last pap:  09/22/21   Last physical:  09/22/21      Future Appointments   NO FUTURE OFFICE VISITS WITH PCP. Sending [employer].

## 2022-10-20 ENCOUNTER — TELEPHONE (OUTPATIENT)
Dept: NEUROLOGY | Facility: CLINIC | Age: 22
End: 2022-10-20

## 2022-10-20 NOTE — TELEPHONE ENCOUNTER
Parameds requested all medical records for the last 5 years.     To be completed by scan stat  Copy to scanning

## 2022-11-08 ENCOUNTER — OFFICE VISIT (OUTPATIENT)
Dept: FAMILY MEDICINE CLINIC | Facility: CLINIC | Age: 22
End: 2022-11-08
Payer: COMMERCIAL

## 2022-11-08 VITALS
BODY MASS INDEX: 21.91 KG/M2 | TEMPERATURE: 98 F | WEIGHT: 131.5 LBS | HEART RATE: 113 BPM | OXYGEN SATURATION: 99 % | DIASTOLIC BLOOD PRESSURE: 72 MMHG | RESPIRATION RATE: 16 BRPM | HEIGHT: 65 IN | SYSTOLIC BLOOD PRESSURE: 118 MMHG

## 2022-11-08 DIAGNOSIS — Z00.00 WELL ADULT EXAM: Primary | ICD-10-CM

## 2022-11-08 PROCEDURE — 87491 CHLMYD TRACH DNA AMP PROBE: CPT | Performed by: INTERNAL MEDICINE

## 2022-11-08 PROCEDURE — 87591 N.GONORRHOEAE DNA AMP PROB: CPT | Performed by: INTERNAL MEDICINE

## 2022-11-08 PROCEDURE — 3008F BODY MASS INDEX DOCD: CPT | Performed by: INTERNAL MEDICINE

## 2022-11-08 PROCEDURE — 99395 PREV VISIT EST AGE 18-39: CPT | Performed by: INTERNAL MEDICINE

## 2022-11-08 PROCEDURE — 3074F SYST BP LT 130 MM HG: CPT | Performed by: INTERNAL MEDICINE

## 2022-11-08 PROCEDURE — 3078F DIAST BP <80 MM HG: CPT | Performed by: INTERNAL MEDICINE

## 2022-11-08 RX ORDER — NORETHINDRONE ACETATE AND ETHINYL ESTRADIOL 1MG-20(21)
1 KIT ORAL DAILY
Qty: 3 EACH | Refills: 3 | Status: SHIPPED | OUTPATIENT
Start: 2022-11-08 | End: 2023-02-06

## 2022-11-08 RX ORDER — NITROFURANTOIN 25; 75 MG/1; MG/1
100 CAPSULE ORAL 2 TIMES DAILY
COMMUNITY
Start: 2022-11-03

## 2022-11-09 ENCOUNTER — PATIENT MESSAGE (OUTPATIENT)
Dept: FAMILY MEDICINE CLINIC | Facility: CLINIC | Age: 22
End: 2022-11-09

## 2022-11-09 LAB
C TRACH DNA SPEC QL NAA+PROBE: NEGATIVE
N GONORRHOEA DNA SPEC QL NAA+PROBE: NEGATIVE

## 2022-11-10 NOTE — TELEPHONE ENCOUNTER
From: Nacho Carrillo  Sent: 11/9/2022 5:19 PM CST  To: [facility] Clinical Staff  Subject: Results    Thanks for checking! I saw the note and results. No questions at this time. Have a good day!

## 2022-11-16 ENCOUNTER — OFFICE VISIT (OUTPATIENT)
Dept: NEUROLOGY | Facility: CLINIC | Age: 22
End: 2022-11-16
Payer: COMMERCIAL

## 2022-11-16 VITALS
HEIGHT: 65 IN | HEART RATE: 66 BPM | SYSTOLIC BLOOD PRESSURE: 102 MMHG | RESPIRATION RATE: 16 BRPM | BODY MASS INDEX: 21.99 KG/M2 | DIASTOLIC BLOOD PRESSURE: 68 MMHG | WEIGHT: 132 LBS

## 2022-11-16 DIAGNOSIS — G43.009 MIGRAINE WITHOUT AURA AND WITHOUT STATUS MIGRAINOSUS, NOT INTRACTABLE: Primary | ICD-10-CM

## 2022-11-16 PROCEDURE — 99214 OFFICE O/P EST MOD 30 MIN: CPT | Performed by: OTHER

## 2022-11-16 PROCEDURE — 3074F SYST BP LT 130 MM HG: CPT | Performed by: OTHER

## 2022-11-16 PROCEDURE — 3008F BODY MASS INDEX DOCD: CPT | Performed by: OTHER

## 2022-11-16 PROCEDURE — 3078F DIAST BP <80 MM HG: CPT | Performed by: OTHER

## 2022-11-16 RX ORDER — PREDNISONE 10 MG/1
TABLET ORAL
Qty: 30 TABLET | Refills: 0 | Status: SHIPPED | OUTPATIENT
Start: 2022-11-16

## 2023-03-16 DIAGNOSIS — G43.009 MIGRAINE WITHOUT AURA AND WITHOUT STATUS MIGRAINOSUS, NOT INTRACTABLE: ICD-10-CM

## 2023-03-16 DIAGNOSIS — G44.031 INTRACTABLE EPISODIC PAROXYSMAL HEMICRANIA: ICD-10-CM

## 2023-03-16 RX ORDER — DIVALPROEX SODIUM 500 MG/1
500 TABLET, EXTENDED RELEASE ORAL DAILY
Qty: 90 TABLET | Refills: 1 | Status: SHIPPED | OUTPATIENT
Start: 2023-03-16

## 2023-06-29 ENCOUNTER — TELEPHONE (OUTPATIENT)
Dept: NEUROLOGY | Facility: CLINIC | Age: 23
End: 2023-06-29

## 2023-06-29 DIAGNOSIS — G44.031 INTRACTABLE EPISODIC PAROXYSMAL HEMICRANIA: ICD-10-CM

## 2023-06-29 DIAGNOSIS — G43.009 MIGRAINE WITHOUT AURA AND WITHOUT STATUS MIGRAINOSUS, NOT INTRACTABLE: ICD-10-CM

## 2023-06-29 RX ORDER — UBROGEPANT 100 MG/1
1 TABLET ORAL AS NEEDED
Qty: 10 TABLET | Refills: 5 | Status: SHIPPED | OUTPATIENT
Start: 2023-06-29

## 2023-07-04 DIAGNOSIS — G44.031 INTRACTABLE EPISODIC PAROXYSMAL HEMICRANIA: ICD-10-CM

## 2023-07-04 DIAGNOSIS — G43.009 MIGRAINE WITHOUT AURA AND WITHOUT STATUS MIGRAINOSUS, NOT INTRACTABLE: ICD-10-CM

## 2023-07-04 RX ORDER — UBROGEPANT 100 MG/1
1 TABLET ORAL AS NEEDED
Qty: 10 TABLET | Refills: 5 | Status: CANCELLED | OUTPATIENT
Start: 2023-07-04

## 2023-07-05 NOTE — TELEPHONE ENCOUNTER
Medication:Ubrelvy 100 mg     Date of last refill: 06/29/2023 with 5 addt refills  [place]      Last office visit: 11/16/2022  Due back to clinic per last office note:    Date next office visit scheduled:  11/15/2023                    Last OV note recommendation:     Discussion plus Diagnostics & Treatment Orders: We are going to try prednisone first and if it does not settle things down then we are going to increase Depakote to 750 mg daily. We will try to stick with indomethacin/Ubrelvy combination treatment. If not better then we will switch Depakote to the monthly injectable CGRP blockers. She had previously failed verapamil because the paroxysmal hemicrania shooting pain was her description. (x) Discussed potential side effects of any treatment relevant to above. Includes explanation of tests as necessary. Return in about 6 months (around 5/16/2023).

## 2023-07-05 NOTE — TELEPHONE ENCOUNTER
Received fax from [address] received for patient use of Isaac Rojo   Approval granted: 7/5/23 - 7/5/24        Pt notified

## 2023-09-23 DIAGNOSIS — G44.031 INTRACTABLE EPISODIC PAROXYSMAL HEMICRANIA: ICD-10-CM

## 2023-09-23 DIAGNOSIS — G43.009 MIGRAINE WITHOUT AURA AND WITHOUT STATUS MIGRAINOSUS, NOT INTRACTABLE: ICD-10-CM

## 2023-09-25 RX ORDER — DIVALPROEX SODIUM 500 MG/1
500 TABLET, EXTENDED RELEASE ORAL NIGHTLY
Qty: 90 TABLET | Refills: 0 | Status: SHIPPED | OUTPATIENT
Start: 2023-09-25

## 2023-09-25 NOTE — TELEPHONE ENCOUNTER
Medication:Divalproex 500 mg ER     Date of last refill: 03/16/2023 with 1 addt refills  [place]      Last office visit: 11/16/2022  Due back to clinic per last office note:    Date next office visit scheduled:  11/15/2023                    Last OV note recommendation:     Discussion plus Diagnostics & Treatment Orders: We are going to try prednisone first and if it does not settle things down then we are going to increase Depakote to 750 mg daily. We will try to stick with indomethacin/Ubrelvy combination treatment. If not better then we will switch Depakote to the monthly injectable CGRP blockers. She had previously failed verapamil because the paroxysmal hemicrania shooting pain was her description. (x) Discussed potential side effects of any treatment relevant to above. Includes explanation of tests as necessary. Return in about 6 months (around 5/16/2023).

## 2023-10-23 RX ORDER — NORETHINDRONE ACETATE AND ETHINYL ESTRADIOL AND FERROUS FUMARATE 1MG-20(21)
1 KIT ORAL EVERY MORNING
Qty: 84 TABLET | Refills: 0 | Status: SHIPPED | OUTPATIENT
Start: 2023-10-23

## 2023-10-23 NOTE — TELEPHONE ENCOUNTER
OV 11/2022  Refill 11/2022 #3 each +3RF  PAP 09/2021    Requested Prescriptions     Pending Prescriptions Disp Refills    JUNEL FE 1/20 1-20 MG-MCG Oral Tab [Pharmacy Med Name: Esaw Hartselle 1 MG-20 MCG TABLET] 84 tablet 3     Sig: TAKE 1 TABLET BY MOUTH EVERY MORNING     Future Appointments   Date Time Provider Renuka Bryant   11/15/2023  9:20 AM MD JULIETH Arcos [facility]   11/18/2023  9:00 AM Zelda Greer MD [facility] [place]

## 2023-11-15 ENCOUNTER — OFFICE VISIT (OUTPATIENT)
Dept: NEUROLOGY | Facility: CLINIC | Age: 23
End: 2023-11-15
Payer: COMMERCIAL

## 2023-11-15 VITALS
HEART RATE: 66 BPM | WEIGHT: 132 LBS | RESPIRATION RATE: 16 BRPM | HEIGHT: 65 IN | DIASTOLIC BLOOD PRESSURE: 76 MMHG | BODY MASS INDEX: 21.99 KG/M2 | SYSTOLIC BLOOD PRESSURE: 106 MMHG

## 2023-11-15 DIAGNOSIS — G44.031 INTRACTABLE EPISODIC PAROXYSMAL HEMICRANIA: ICD-10-CM

## 2023-11-15 DIAGNOSIS — G43.009 MIGRAINE WITHOUT AURA AND WITHOUT STATUS MIGRAINOSUS, NOT INTRACTABLE: Primary | ICD-10-CM

## 2023-11-15 DIAGNOSIS — R11.0 NAUSEA: ICD-10-CM

## 2023-11-15 PROCEDURE — 3078F DIAST BP <80 MM HG: CPT | Performed by: OTHER

## 2023-11-15 PROCEDURE — 99214 OFFICE O/P EST MOD 30 MIN: CPT | Performed by: OTHER

## 2023-11-15 PROCEDURE — 3074F SYST BP LT 130 MM HG: CPT | Performed by: OTHER

## 2023-11-15 PROCEDURE — 3008F BODY MASS INDEX DOCD: CPT | Performed by: OTHER

## 2023-11-15 RX ORDER — UBROGEPANT 100 MG/1
1 TABLET ORAL AS NEEDED
Qty: 10 TABLET | Refills: 5 | Status: SHIPPED | OUTPATIENT
Start: 2023-11-15

## 2023-11-15 RX ORDER — PREDNISONE 10 MG/1
TABLET ORAL
Qty: 30 TABLET | Refills: 0 | Status: SHIPPED | OUTPATIENT
Start: 2023-11-15

## 2023-11-15 RX ORDER — DIVALPROEX SODIUM 250 MG/1
250 TABLET, EXTENDED RELEASE ORAL DAILY
Qty: 30 TABLET | Refills: 5 | Status: SHIPPED | OUTPATIENT
Start: 2023-11-15

## 2023-11-18 ENCOUNTER — OFFICE VISIT (OUTPATIENT)
Dept: FAMILY MEDICINE CLINIC | Facility: CLINIC | Age: 23
End: 2023-11-18
Payer: COMMERCIAL

## 2023-11-18 VITALS
TEMPERATURE: 99 F | DIASTOLIC BLOOD PRESSURE: 84 MMHG | HEIGHT: 63.39 IN | BODY MASS INDEX: 23.8 KG/M2 | SYSTOLIC BLOOD PRESSURE: 126 MMHG | OXYGEN SATURATION: 98 % | WEIGHT: 136 LBS | HEART RATE: 92 BPM | RESPIRATION RATE: 18 BRPM

## 2023-11-18 DIAGNOSIS — Z00.00 WELL ADULT EXAM: Primary | ICD-10-CM

## 2023-11-18 PROCEDURE — 99395 PREV VISIT EST AGE 18-39: CPT | Performed by: INTERNAL MEDICINE

## 2023-11-18 PROCEDURE — 3074F SYST BP LT 130 MM HG: CPT | Performed by: INTERNAL MEDICINE

## 2023-11-18 PROCEDURE — 3008F BODY MASS INDEX DOCD: CPT | Performed by: INTERNAL MEDICINE

## 2023-11-18 PROCEDURE — 3079F DIAST BP 80-89 MM HG: CPT | Performed by: INTERNAL MEDICINE

## 2023-11-18 NOTE — PROGRESS NOTES
REVIEWED HX WITH PATIENT AND PHYSICAL NORMAL; no labs today and migraines controlled, thank you Dr Estela Bauman for your expertise  No labs, did not want STD or HIV testing.

## 2023-11-27 RX ORDER — INDOMETHACIN 50 MG/1
CAPSULE ORAL
Qty: 60 CAPSULE | Refills: 5 | Status: SHIPPED | OUTPATIENT
Start: 2023-11-27

## 2023-11-27 NOTE — TELEPHONE ENCOUNTER
Medication: Indomethacin 50 mg     Date of last refill: 0822/2022 with 5 addt refills  [place]      Last office visit: 11/15/2023  Due back to clinic per last office note:    Date next office visit scheduled:    Future Appointments   Date Time Provider Renuka Bryant   5/15/2024 10:00 AM MD JULIETH Polo [facility]           Last OV note recommendation:    Discussion plus Diagnostics & Treatment Orders:  Advised on rationale for early intervention and can combined Ubrelvy and indomethacin     Increase Depakote to 750  Gave PRednisone as back up if HA still bad     See in 6 months                 (x) Discussed potential side effects of any treatment relevant to above. Includes explanation of tests as necessary. Return in about 6 months (around 5/15/2024).

## 2023-12-15 ENCOUNTER — PATIENT MESSAGE (OUTPATIENT)
Dept: NEUROLOGY | Facility: CLINIC | Age: 23
End: 2023-12-15

## 2023-12-15 DIAGNOSIS — G44.031 INTRACTABLE EPISODIC PAROXYSMAL HEMICRANIA: ICD-10-CM

## 2023-12-15 DIAGNOSIS — G43.009 MIGRAINE WITHOUT AURA AND WITHOUT STATUS MIGRAINOSUS, NOT INTRACTABLE: ICD-10-CM

## 2023-12-15 NOTE — TELEPHONE ENCOUNTER
From: Iza Ramirez Eager  To: Ariel Walsh  Sent: 12/15/2023 7:31 AM CST  Subject: Update you requested     Hi Dr. Sharron Schmid,  I recall you asking I send an update message after our last appointment. I have finished the prednisone you prescribed and increased the preventative meds as recommended. That seems to be helping as i dont think i have had a full migraine since we met, however I have had a headache almost daily for the last few weeks. My  pointed out that I was having them almost everyday, so I started tracking it. I know I have had one every day for the last 7 days which is getting a little old. So if you have any advice on managing that, I would appreciate it.      Thank you,  Brianda Foss

## 2023-12-16 RX ORDER — DIVALPROEX SODIUM 500 MG/1
500 TABLET, EXTENDED RELEASE ORAL 2 TIMES DAILY
Qty: 180 TABLET | Refills: 5 | Status: SHIPPED | OUTPATIENT
Start: 2023-12-16

## 2024-01-02 DIAGNOSIS — G44.031 INTRACTABLE EPISODIC PAROXYSMAL HEMICRANIA: ICD-10-CM

## 2024-01-02 DIAGNOSIS — G43.009 MIGRAINE WITHOUT AURA AND WITHOUT STATUS MIGRAINOSUS, NOT INTRACTABLE: ICD-10-CM

## 2024-01-02 RX ORDER — DIVALPROEX SODIUM 500 MG/1
500 TABLET, EXTENDED RELEASE ORAL NIGHTLY
Qty: 180 TABLET | Refills: 3 | Status: SHIPPED | OUTPATIENT
Start: 2024-01-02

## 2024-01-02 NOTE — TELEPHONE ENCOUNTER
Medication:Divalproex 500 mg     Date of last refill: 12/16/2023 with 5 addt refills  [place]      Last office visit: 11/15/2023  Due back to clinic per last office note:    Date next office visit scheduled:           Future Appointments   Date Time Provider Department Center   5/15/2024 10:00 AM Chilo Alvarez MD [facility] [place]            Last OV note recommendation:     Discussion plus Diagnostics & Treatment Orders:  Advised on rationale for early intervention and can combined Ubrelvy and indomethacin     Increase Depakote to 750  Gave PRednisone as back up if HA still bad     See in 6 months

## 2024-01-19 RX ORDER — NORETHINDRONE ACETATE AND ETHINYL ESTRADIOL 1MG-20(21)
1 KIT ORAL EVERY MORNING
Qty: 84 TABLET | Refills: 2 | Status: CANCELLED | OUTPATIENT
Start: 2024-01-19

## 2024-01-19 RX ORDER — NORETHINDRONE ACETATE AND ETHINYL ESTRADIOL AND FERROUS FUMARATE 1MG-20(21)
1 KIT ORAL EVERY MORNING
Qty: 84 TABLET | Refills: 2 | Status: SHIPPED | OUTPATIENT
Start: 2024-01-19

## 2024-01-19 NOTE — TELEPHONE ENCOUNTER
JUNEL FE 1/20 1-20 MG-MCG Oral Tab     Gynecology Medication Protocol [id number]/19/2024 12:06 AM    PASS-PENDING LAST PAP WNL--VIA MANUAL LOOKUP    Physical or Pelvic/Breast in past 12 or next 3 mos--VIA MANUAL LOOKUP      Last office visit:  11/18/23    Future Appointments   Date Time Provider Department Center   5/15/2024 10:00 AM Chilo Alvarez MD [facility] [place]   Last filled:  10/23/23  #84 with 0 refills    Last labs:  last pap 9/22/21        DUE FOR pap 9/24

## 2024-01-22 NOTE — TELEPHONE ENCOUNTER
Gynecology Medication Protocol Aqgpuu4301/19/2024 07:52 PM    PASS-PENDING LAST PAP WNL--VIA MANUAL LOOKUP    Physical or Pelvic/Breast in past 12 or next 3 mos--VIA MANUAL LOOKUP     Last refill - 1/19/24   Duplicate request

## 2024-03-19 ENCOUNTER — TELEPHONE (OUTPATIENT)
Dept: NEUROLOGY | Facility: CLINIC | Age: 24
End: 2024-03-19

## 2024-03-21 NOTE — TELEPHONE ENCOUNTER
Received fax from [employer]   Approval received for patient use of Ubrelvy   Approval granted: 2/20/24-3/21/25        Pt notified

## 2024-04-18 DIAGNOSIS — G43.009 MIGRAINE WITHOUT AURA AND WITHOUT STATUS MIGRAINOSUS, NOT INTRACTABLE: ICD-10-CM

## 2024-04-18 DIAGNOSIS — G44.031 INTRACTABLE EPISODIC PAROXYSMAL HEMICRANIA: ICD-10-CM

## 2024-04-18 RX ORDER — NORETHINDRONE ACETATE AND ETHINYL ESTRADIOL 1MG-20(21)
1 KIT ORAL EVERY MORNING
Qty: 84 TABLET | Refills: 2 | Status: SHIPPED | OUTPATIENT
Start: 2024-04-18

## 2024-04-18 RX ORDER — DIVALPROEX SODIUM 500 MG/1
500 TABLET, EXTENDED RELEASE ORAL NIGHTLY
Qty: 180 TABLET | Refills: 3 | Status: CANCELLED | OUTPATIENT
Start: 2024-04-18

## 2024-04-18 NOTE — TELEPHONE ENCOUNTER
LOV:11-18-23    LAST LAB:6-    LAST RX:  Norethin Ace-Eth Estrad-FE (JUNEL FE 1/20) 1-20 MG-MCG Oral Tab 84 tablet 2 1/19/2024 --   Sig:   TAKE 1 TABLET BY MOUTH EVERY DAY IN THE MORNING     Route:   Oral         Next OV:   Future Appointments   Date Time Provider Department Center   5/15/2024 10:00 AM [facility]ilo Alvarez [facility] [facility]          PROTOCOL  Gynecology Medication Protocol [id number]/18/2024 01:34 PM    PASS-PENDING LAST PAP WNL--VIA MANUAL LOOKUP    Physical or Pelvic/Breast in past 12 or next 3 mos--VIA MANUAL LOOKUP

## 2024-04-18 NOTE — TELEPHONE ENCOUNTER
Medication: Ubrelvy 100 mg     Date of last refill: 11/15/2024 with 5 addt refills  [place]      Last office visit: 11/15/2023  Due back to clinic per last office note:    Date next office visit scheduled:                Future Appointments   Date Time Provider Department Center   5/15/2024 10:00 AM Chilo Alvarez MD [place] [facility] [place]            Last OV note recommendation:     Discussion plus Diagnostics & Treatment Orders:  Advised on rationale for early intervention and can combined Ubrelvy and indomethacin     Increase Depakote to 750  Gave PRednisone as back up if HA still bad     See in 6 months

## 2024-04-19 DIAGNOSIS — R11.0 NAUSEA: ICD-10-CM

## 2024-04-19 RX ORDER — UBROGEPANT 100 MG/1
1 TABLET ORAL AS NEEDED
Qty: 10 TABLET | Refills: 5 | Status: SHIPPED | OUTPATIENT
Start: 2024-04-19

## 2024-04-19 RX ORDER — ONDANSETRON 4 MG/1
4 TABLET, FILM COATED ORAL EVERY 8 HOURS PRN
Qty: 14 TABLET | Refills: 5 | Status: SHIPPED | OUTPATIENT
Start: 2024-04-19

## 2024-04-19 NOTE — TELEPHONE ENCOUNTER
Medication:Ondansetron 4mg     Date of last refill: 11/15/2024 with 5 addt refills  [place]      Last office visit: 11/15/2023  Due back to clinic per last office note:    Date next office visit scheduled:                Future Appointments   Date Time Provider Department Center   5/15/2024 10:00 AM Chilo Alvarez MD [place] [facility]            Last OV note recommendation:     Discussion plus Diagnostics & Treatment Orders:  Advised on rationale for early intervention and can combined Ubrelvy and indomethacin     Increase Depakote to 750  Gave PRednisone as back up if HA still bad     See in 6 months

## 2024-04-30 DIAGNOSIS — G43.009 MIGRAINE WITHOUT AURA AND WITHOUT STATUS MIGRAINOSUS, NOT INTRACTABLE: ICD-10-CM

## 2024-04-30 DIAGNOSIS — G44.031 INTRACTABLE EPISODIC PAROXYSMAL HEMICRANIA: ICD-10-CM

## 2024-05-01 RX ORDER — DIVALPROEX SODIUM 500 MG/1
500 TABLET, EXTENDED RELEASE ORAL NIGHTLY
Qty: 180 TABLET | Refills: 3 | OUTPATIENT
Start: 2024-05-01

## 2024-05-01 NOTE — TELEPHONE ENCOUNTER
Divalproex ER 500mg last refilled 1/2/24 #180/3R to requesting pharmacy.    [employer] msg sent to pt to contact pharmacy as a year supply was last filled.    RN, please refuse as per above.

## 2024-05-15 ENCOUNTER — OFFICE VISIT (OUTPATIENT)
Dept: NEUROLOGY | Facility: CLINIC | Age: 24
End: 2024-05-15

## 2024-05-15 ENCOUNTER — TELEPHONE (OUTPATIENT)
Dept: NEUROLOGY | Facility: CLINIC | Age: 24
End: 2024-05-15

## 2024-05-15 VITALS
HEIGHT: 63 IN | SYSTOLIC BLOOD PRESSURE: 118 MMHG | DIASTOLIC BLOOD PRESSURE: 80 MMHG | WEIGHT: 136 LBS | RESPIRATION RATE: 16 BRPM | HEART RATE: 83 BPM | BODY MASS INDEX: 24.1 KG/M2

## 2024-05-15 DIAGNOSIS — G43.009 MIGRAINE WITHOUT AURA AND WITHOUT STATUS MIGRAINOSUS, NOT INTRACTABLE: Primary | ICD-10-CM

## 2024-05-15 DIAGNOSIS — G43.709 CHRONIC MIGRAINE W/O AURA W/O STATUS MIGRAINOSUS, NOT INTRACTABLE: ICD-10-CM

## 2024-05-15 DIAGNOSIS — G44.031 INTRACTABLE EPISODIC PAROXYSMAL HEMICRANIA: ICD-10-CM

## 2024-05-15 PROCEDURE — 3079F DIAST BP 80-89 MM HG: CPT | Performed by: OTHER

## 2024-05-15 PROCEDURE — 99214 OFFICE O/P EST MOD 30 MIN: CPT | Performed by: OTHER

## 2024-05-15 PROCEDURE — 3074F SYST BP LT 130 MM HG: CPT | Performed by: OTHER

## 2024-05-15 PROCEDURE — 3008F BODY MASS INDEX DOCD: CPT | Performed by: OTHER

## 2024-05-15 RX ORDER — ATOGEPANT 60 MG/1
60 TABLET ORAL DAILY
Qty: 4 TABLET | Refills: 0 | COMMUNITY
Start: 2024-05-15

## 2024-05-15 RX ORDER — ATOGEPANT 60 MG/1
60 TABLET ORAL DAILY
Qty: 30 TABLET | Refills: 5 | Status: SHIPPED | OUTPATIENT
Start: 2024-05-15

## 2024-05-15 RX ORDER — INDOMETHACIN 50 MG/1
CAPSULE ORAL
Qty: 60 CAPSULE | Refills: 5 | Status: SHIPPED | OUTPATIENT
Start: 2024-05-15

## 2024-05-15 NOTE — TELEPHONE ENCOUNTER
PA requested for Qulipta  Clinical questions answered and submitted to insurance  Awaiting coverage determination

## 2024-05-15 NOTE — PATIENT INSTRUCTIONS
Refill policies:    Allow 2-3 business days for refills; controlled substances may take longer.  Contact your pharmacy at least 5 days prior to running out of medication and have them send an electronic request or submit request through the “request refill” option in your [employer] account.  Refills are not addressed on weekends; covering physicians do not authorize routine medications on weekends.  No narcotics or controlled substances are refilled after noon on Fridays or by on call physicians.  By law, narcotics must be electronically prescribed.  A 30 day supply with no refills is the maximum allowed.  If your prescription is due for a refill, you may be due for a follow up appointment.  To best provide you care, patients receiving routine medications need to be seen at least once a year.  Patients receiving narcotic/controlled substance medications need to be seen at least once every 3 months.  In the event that your preferred pharmacy does not have the requested medication in stock (e.g. Backordered), it is your responsibility to find another pharmacy that has the requested medication available.  We will gladly send a new prescription to that pharmacy at your request.    Scheduling Tests:    If your physician has ordered radiology tests such as MRI or CT scans, please contact Central Scheduling at [phone] right away to schedule the test.  Once scheduled, the [facility] Centralized Referral Team will work with your insurance carrier to obtain pre-certification or prior authorization.  Depending on your insurance carrier, approval may take 3-10 days.  It is highly recommended patients assure they have received an authorization before having a test performed.  If test is done without insurance authorization, patient may be responsible for the entire amount billed.      Precertification and Prior Authorizations:  If your physician has recommended that you have a procedure or additional testing performed the [facility]  Centralized Referral Team will contact your insurance carrier to obtain pre-certification or prior authorization.    You are strongly encouraged to contact your insurance carrier to verify that your procedure/test has been approved and is a COVERED benefit.  Although the [facility] Centralized Referral Team does its due diligence, the insurance carrier gives the disclaimer that \"Although the procedure is authorized, this does not guarantee payment.\"    Ultimately the patient is responsible for payment.   Thank you for your understanding in this matter.  Paperwork Completion:  If you require FMLA or disability paperwork for your recovery, please make sure to either drop it off or have it faxed to our office at [phone]. Be sure the form has your name and date of birth on it.  The form will be faxed to our Forms Department and they will complete it for you.  There is a 25$ fee for all forms that need to be filled out.  Please be aware there is a 10-14 day turnaround time.  You will need to sign a release of information (MIRANDA) form if your paperwork does not come with one.  You may call the Forms Department with any questions at [phone].  Their fax number is [phone].

## 2024-05-15 NOTE — PROGRESS NOTES
NEUROLOGY  [facility]       Casandra Curtis  1/30/2000  Primary Care Provider:  Maryan Etienne MD    5/15/2024  24 year old yo,  was last seen on:: Nov    Seen for/plans last visit:  Problem/s Identified this visit:   1. Migraine without aura and without status migrainosus, not intractable    2. Intractable episodic paroxysmal hemicrania    3. Nausea             Discussion plus Diagnostics & Treatment Orders:  Advised on rationale for early intervention and can combined Ubrelvy and indomethacin     Increase Depakote to 750  Gave PRednisone as back up if HA still bad       Previous visit and existing record notes reviewed in preparation for the face to face visit.  Relevant labs and studies reviewed and will be noted in relevant areas of this record.  Accompanied visit:     (x) No.      Present condition:  Gets migraine attacks maybe once a week during good weeks and more during bad weeks.  She has about 3-4 disabling headaches a month but half of the time she gets some degree of headaches for which she may or may not take any abortive treatment.  Overall she has been better at intervening with the more severe headaches but the frequency is still the same despite increasing the Depakote.    Prior to this she was on other preventative treatment which also failed.      Past History update/new problem(s): No new problem    Review of Systems:  Review of Systems:  Denies systemic symptoms.     No CP or SOB.  No GI or  symptoms. Relevant Neuro as noted above.      Medications:      Current Outpatient Medications:     indomethacin 50 MG Oral Cap, TAKE ONE CAPSULE BY MOUTH 1-2 TIMES DAILY AS NEEDED, Disp: 60 capsule, Rfl: 5    Atogepant (QULIPTA) 60 MG Oral Tab, Take 60 mg by mouth daily., Disp: 30 tablet, Rfl: 5    Atogepant (QULIPTA) 60 MG Oral Tab, Take 60 mg by mouth daily., Disp: 4 tablet, Rfl: 0    ubrogepant (UBRELVY) 100 MG Oral Tab, Take 100 mg by mouth as needed. MAY TAKE ADDITIONAL TABLET  IN 2 HOURS IF NEEDED. DO NOT EXCEED TWO TABLETS PER 24 HOUR PERIOD, Disp: 10 tablet, Rfl: 5    ondansetron (ZOFRAN) 4 mg tablet, Take 1 tablet (4 mg total) by mouth every 8 (eight) hours as needed for Nausea., Disp: 14 tablet, Rfl: 5    Norethin Ace-Eth Estrad-FE (JUNEL FE 1/20) 1-20 MG-MCG Oral Tab, Take 1 tablet by mouth every morning., Disp: 84 tablet, Rfl: 2    divalproex  MG Oral Tablet 24 Hr, Take 1 tablet (500 mg total) by mouth nightly., Disp: 180 tablet, Rfl: 3  PRN:     Allergies:  No Known Allergies       EXAM:  /80 (BP Location: Left arm, Patient Position: Sitting, Cuff Size: adult)   Pulse 83   Resp 16   Ht 63\"   Wt 136 lb (61.7 kg)   LMP 04/24/2024 (Exact Date)   BMI 24.09 kg/m²   Looks stated age  General Exam:  HENT:  pink conjunctiva anicteric sclerae  Neck no adenopathy, thyroid normal  Heart and Lungs:  normal  Extremities: no cyanosis, skin changes    NEURO  NEURO  Able to relate events with fluent speech and intact comprehension  CN 2-12: pupils reactive, VF full face symmetric sensation and movement tongue midline  No motor focal findings  Sensory: no lateralizing findings  Reflexes are symmetric  UMN signs: none  Gait: narrow based          INTERPRETATION of RELEVANT LABS and other DATA:          Problem/s Identified this visit:   1. Migraine without aura and without status migrainosus, not intractable    2. Intractable episodic paroxysmal hemicrania    3. Chronic migraine w/o aura w/o status migrainosus, not intractable          Discussion plus Diagnostics & Treatment Orders:  She has unmet needs in terms of preventative treatment.  So far it looks like abortive treatment is good and she is successful in stopping them but she can do better with preventative.    She is averse to using needles and therefore CGRP injectables may not be the way to go.    We are going to try Qulipta 60 mg daily as preventative to replace Depakote      (x) Discussed potential side effects of any  treatment relevant to above.  Includes explanation of tests as necessary.    Return in about 6 months (around 11/15/2024).      Patient understands that if needed, based on condition and or test results, follow up will be readjusted      Chilo Alvarez MD  Vascular & General Neurology  Director, Multiple Sclerosis Program  [facility]  5/15/2024, Time completed 10:09 AM    Decision making:  ( x ) labs reviewed/ordered - 1  (  ) new diagnosis: - 1  ( x) Images & studies independently reviewed -non F2F  (  ) Case/studies discussed with other caregivers - -non F2F  (  ) Telephone time with patiern or authorized Fam member--non F2F  ( x ) other records reviewed --non F2F including consultations  (  ) Fam meetings - patient not present --non F2F  (  ) Independent Historian obtained    Non Face to Face CPT code 43244/55166 applies as documented above    PROCEDURE DONE     (   ) see notes        After visit, patient was escorted out and handed-off discharge process and instructions to the check out desk.  No additional issues relevant to visit were raised to staff at this time interval.        This document is to be interpreted as my current opinion regarding the case as of the stated date of service based on the information available to me at this time and may supersedes any prior opinion expressed either orally or in writing.  Services rendered are only within the scope of direct medical care  Sometimes, reports may have been prepared partially using a speech recognition software technology.  If a word or phrase is confusing or out of context, please do not hesitate to call for clarification.

## 2024-05-16 NOTE — TELEPHONE ENCOUNTER
Prior authorization received for patient use of Qulipta 60 mg tablets.    Authorization granted: 4/15/2024 - 5/15/2025    Faxed to pharmacy and placed in approval binder.

## 2024-06-21 ENCOUNTER — TELEPHONE (OUTPATIENT)
Dept: FAMILY MEDICINE CLINIC | Facility: CLINIC | Age: 24
End: 2024-06-21

## 2024-06-21 RX ORDER — NITROFURANTOIN 25; 75 MG/1; MG/1
100 CAPSULE ORAL 2 TIMES DAILY
Qty: 14 CAPSULE | Refills: 0 | Status: SHIPPED | OUTPATIENT
Start: 2024-06-21 | End: 2024-06-28

## 2024-06-21 NOTE — TELEPHONE ENCOUNTER
"SUBJECTIVE:   Michael Maldonado is a 78 year old male who presents for Preventive Visit.      Patient has been advised of split billing requirements and indicates understanding: Yes  Are you in the first 12 months of your Medicare coverage?  No    Healthy Habits:     In general, how would you rate your overall health?  Good    Frequency of exercise:  2-3 days/week    Duration of exercise:  30-45 minutes    Do you usually eat at least 4 servings of fruit and vegetables a day, include whole grains    & fiber and avoid regularly eating high fat or \"junk\" foods?  No    Taking medications regularly:  Yes    Barriers to taking medications:  None    Medication side effects:  None    Ability to successfully perform activities of daily living:  No assistance needed    Home Safety:  No safety concerns identified    Hearing Impairment:  Difficulty following a conversation in a noisy restaurant or crowded room    In the past 6 months, have you been bothered by leaking of urine?  No    In general, how would you rate your overall mental or emotional health?  Excellent      PHQ-2 Total Score: 0    Additional concerns today:  No    Do you feel safe in your environment? Yes    Have you ever done Advance Care Planning? (For example, a Health Directive, POLST, or a discussion with a medical provider or your loved ones about your wishes): Yes, advance care planning is on file.       Fall risk  Fallen 2 or more times in the past year?: No  Any fall with injury in the past year?: No  None  Cognitive Screening   1) Repeat 3 items (Leader, Season, Table)    2) Clock draw: NORMAL  3) 3 item recall: Recalls NO objects   Results: 0 items recalled: PROBABLE COGNITIVE IMPAIRMENT, **INFORM PROVIDER**    Mini-CogTM Copyright JOSE Grover. Licensed by the author for use in [facility]; reprinted with permission (matilde@.[facility]). All rights reserved.      Do you have sleep apnea, excessive snoring or daytime drowsiness?: no    Reviewed and " Received a fax from [facility] with a urine culture result. Upon review of the chart, I see patient was seen at [facility] urgent care on 6/18/24 for possible UTI. Patient was prescribed macrobid 1 tab PO BID x 3 days. Call placed to patient to see how she is feeling. Unable to leave a message. Will have a nurse call her back at a later time. Urine culture results given to Yudith Leung. Please advise.    "updated as needed this visit by clinical staff  Tobacco  Allergies  Meds             Reviewed and updated as needed this visit by Provider    Meds            Social History     Tobacco Use     Smoking status: Former Smoker     Types: Cigars, Cigars     Quit date: 2000     Years since quittin.0     Smokeless tobacco: Never Used   Substance Use Topics     Alcohol use: Yes     Alcohol/week: 1.0 standard drink     Comment: Alcoholic Drinks/day: nightly     If you drink alcohol do you typically have >3 drinks per day or >7 drinks per week? No    Alcohol Use 2022   Prescreen: >3 drinks/day or >7 drinks/week? No   Prescreen: >3 drinks/day or >7 drinks/week? -     Current providers sharing in care for this patient include:   Patient Care Team:  Malvin Caballero MD as PCP - General (Internal Medicine)  Elke Rico, PharmD as Pharmacist (Pharmacist)  Malvin Caballero MD as Assigned PCP    The following health maintenance items are reviewed in Epic and correct as of today:  Health Maintenance Due   Topic Date Due     ANNUAL REVIEW OF HM ORDERS  Never done     COVID-19 Vaccine (2 - Pfizer 3-dose series) 10/25/2021       Review of Systems  Constitutional, HEENT, cardiovascular, pulmonary, gi and gu systems are negative, except as otherwise noted.    OBJECTIVE:   /76 (BP Location: Right arm, Patient Position: Sitting, Cuff Size: Adult Regular)   Pulse 70   Ht 1.727 m (5' 8\")   Wt 93 kg (205 lb)   SpO2 97%   BMI 31.17 kg/m   Estimated body mass index is 31.17 kg/m  as calculated from the following:    Height as of this encounter: 1.727 m (5' 8\").    Weight as of this encounter: 93 kg (205 lb).  Physical Exam    General: Alert, in no distress  Skin: + Actinic skin changes on the scalp  + Scars from previous right parotid gland surgery, with tissue void along the angle of the right mandible  Eyes/nose/throat: Eyes without scleral icterus, eye movements normal, pupils equal and reactive, " oropharynx clear,   + Bilateral hearing aids  MSK: Neck with good ROM  Lymphatic: Neck without adenopathy or masses  Endocrine: Thyroid with no nodules to palpation  Pulm: Lungs clear to auscultation bilaterally  Cardiac: Heart with regular rate and rhythm, no murmur or gallop  GI: Abdomen obese,  soft, nontender. No palpable enlargement of liver or spleen  MSK: Extremities no tenderness or edema  Neuro: Moves all extremities, without focal weakness  Psych: Alert, normal mental status. 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  I told him the martini probably contains two hundred fifty or three hundred carbohydrate calories per day, which adds up over the course of the week.  I predict that if he cut out the martinis, he would probably lose 5 pounds in a month and 10 pounds in 2 months.  Hopefully he will be more physically active when he gets to [place]    Wt Readings from Last 5 Encounters:   01/21/22 93 kg (205 lb)   11/12/20 96.1 kg (211 lb 14.4 oz)   08/31/20 95.3 kg (210 lb)   02/06/20 93.4 kg (206 lb)   01/22/20 91.6 kg (202 lb)     Probably too much alcohol consumption, switched daily  martini switched to rafael, and he is getting excessive carbohydrate calories from that, plus alcohol adds to blood pressure.      Memory impairment  History of craniotomy for an arteriovenous malformation in 1970, with good long-term recovery.    Could not recall 3 objects today January 21, 2022    Essential hypertension, reasonably well controlled on combination of losartan plus amlodipine.  We will keep his blood pressure medicines the same.  If he can lose the weight and may be reduce the martinis, perhaps he could stop one of his blood pressure medicines.     BP Readings from Last 6 Encounters:   01/21/22 134/76   11/12/20 128/66   08/31/20 138/68   02/06/20 (!) 140/78   01/22/20 138/84   12/10/19 130/72     Hypothyroidism on replacement, on stable levothyroxine dose   11-  TSH 0.30 - 5.00 uIU/mL 4.63      Hyperlipidemia on high-dose simvastatin, lipids reasonably well controlled when last checked January 2020, no history of cardiovascular events.  Cholesterol panel from January 22, 2020 had total cholesterol one seventy-six, HDL a generous sixty-two, well-controlled LDL of eighty-three, triglycerides slightly elevated one fifty-three.     Reduced renal function on a metabolic panel from August 2020, will recheck on this round of testing.  Creatinine was about 1.35, previous levels between 1.1 and 1.2.  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       Actinic keratoses, with lesions on his scalp that need to be examined by dermatology, sees them every 3 months.       Status post total knee replacements right side January 2019 left side twenty thirteen, doing well.       History of deep venous thrombosis associated with the 2019 knee  "arthroplasty, no longer needs to be on anticoagulation.        Vaccinations including seasonal flu vaccine, pneumococcus, and shingles.  Booster:   COVID-19,PF,Pfizer (12+ Yrs) 10/04/2021     Immunization History   Administered Date(s) Administered     COVID-19,PF,Pfizer (12+ Yrs) 10/04/2021     Flu, Unspecified 09/21/2015, 10/11/2017     Influenza (High Dose) 3 valent vaccine 1941, 09/27/2016, 09/26/2018, 09/24/2019     Influenza (IIV3) PF 09/01/2010     Influenza, Quad, High Dose, Pf, 65yr+ (Fluzone HD) 09/09/2020, 09/28/2021     Pneumo Conj 13-V (2010&after) 05/20/2015     Pneumococcal 23 valent 01/01/2007, 08/06/2010     Td,adult,historic,unspecified 01/01/2008     Tdap (Adacel,Boostrix) 08/10/2012     Zoster vaccine recombinant adjuvanted (SHINGRIX) 01/24/2019, 09/19/2019     Zoster vaccine, live 04/25/2012         Patient has been advised of split billing requirements and indicates understanding: Yes    COUNSELING:  Reviewed preventive health counseling, as reflected in patient instructions       Healthy diet/nutrition    Estimated body mass index is 31.17 kg/m  as calculated from the following:    Height as of this encounter: 1.727 m (5' 8\").    Weight as of this encounter: 93 kg (205 lb).    Weight management plan: Discussed healthy diet and exercise guidelines    He reports that he quit smoking about 22 years ago. His smoking use included cigars and cigars. He has never used smokeless tobacco.      Appropriate preventive services were discussed with this patient, including applicable screening as appropriate for cardiovascular disease, diabetes, osteopenia/osteoporosis, and glaucoma.  As appropriate for age/gender, discussed screening for colorectal cancer, prostate cancer, breast cancer, and cervical cancer. Checklist reviewing preventive services available has been given to the patient.    Reviewed patients plan of care and provided an AVS. The Basic Care Plan (routine screening as documented in " Health Maintenance) for Michael meets the Care Plan requirement. This Care Plan has been established and reviewed with the Patient.      LUL SHAFFER MD  [facility]    Identified Health Risks:

## 2024-06-21 NOTE — TELEPHONE ENCOUNTER
Wibiya message sent to patient informing her that an extended script was faxed to pharmacy. Tried calling patient earlier, but she did not  and I was not able to leave a voicemail. Patient was active on [employer] today. Instructed to call back if she has any questions or concerns.

## 2024-06-21 NOTE — TELEPHONE ENCOUNTER
Culture shows 60,000 cfu/ml e.choli.  She should be on an antibiotic for at least 7 to 10 days for this.  I am going to send 7 days more of Macrobid to the pharmacy for her.

## 2024-06-25 ENCOUNTER — MED REC SCAN ONLY (OUTPATIENT)
Dept: FAMILY MEDICINE CLINIC | Facility: CLINIC | Age: 24
End: 2024-06-25

## 2024-11-20 ENCOUNTER — OFFICE VISIT (OUTPATIENT)
Dept: NEUROLOGY | Facility: CLINIC | Age: 24
End: 2024-11-20
Payer: COMMERCIAL

## 2024-11-20 VITALS
SYSTOLIC BLOOD PRESSURE: 118 MMHG | HEIGHT: 63 IN | BODY MASS INDEX: 24.1 KG/M2 | HEART RATE: 60 BPM | RESPIRATION RATE: 16 BRPM | DIASTOLIC BLOOD PRESSURE: 77 MMHG | WEIGHT: 136 LBS

## 2024-11-20 DIAGNOSIS — G43.009 MIGRAINE WITHOUT AURA AND WITHOUT STATUS MIGRAINOSUS, NOT INTRACTABLE: ICD-10-CM

## 2024-11-20 DIAGNOSIS — G44.031 INTRACTABLE EPISODIC PAROXYSMAL HEMICRANIA: ICD-10-CM

## 2024-11-20 DIAGNOSIS — R11.0 NAUSEA: ICD-10-CM

## 2024-11-20 PROCEDURE — 3008F BODY MASS INDEX DOCD: CPT | Performed by: OTHER

## 2024-11-20 PROCEDURE — 3074F SYST BP LT 130 MM HG: CPT | Performed by: OTHER

## 2024-11-20 PROCEDURE — 99214 OFFICE O/P EST MOD 30 MIN: CPT | Performed by: OTHER

## 2024-11-20 PROCEDURE — 3078F DIAST BP <80 MM HG: CPT | Performed by: OTHER

## 2024-11-20 RX ORDER — UBROGEPANT 100 MG/1
1 TABLET ORAL AS NEEDED
Qty: 10 TABLET | Refills: 11 | Status: SHIPPED | OUTPATIENT
Start: 2024-11-20

## 2024-11-20 RX ORDER — ATOGEPANT 60 MG/1
60 TABLET ORAL DAILY
Qty: 90 TABLET | Refills: 3 | Status: SHIPPED | OUTPATIENT
Start: 2024-11-20

## 2024-11-20 RX ORDER — ONDANSETRON 4 MG/1
4 TABLET, FILM COATED ORAL EVERY 8 HOURS PRN
Qty: 20 TABLET | Refills: 5 | Status: SHIPPED | OUTPATIENT
Start: 2024-11-20

## 2024-11-20 RX ORDER — INDOMETHACIN 50 MG/1
CAPSULE ORAL
Qty: 180 CAPSULE | Refills: 3 | Status: SHIPPED | OUTPATIENT
Start: 2024-11-20

## 2024-11-20 RX ORDER — DIVALPROEX SODIUM 500 MG/1
500 TABLET, FILM COATED, EXTENDED RELEASE ORAL NIGHTLY
Qty: 180 TABLET | Refills: 3 | Status: SHIPPED | OUTPATIENT
Start: 2024-11-20

## 2024-11-20 NOTE — PROGRESS NOTES
NEUROLOGY  [facility]       Casandra Curtis  1/30/2000  Primary Care Provider:  Maryan Etienne MD    11/20/2024  24 year old yo,  was last seen on:: May    Seen for/plans last visit:  migraines    Previous visit and existing record notes reviewed in preparation for the face to face visit.  Relevant labs and studies reviewed and will be noted in relevant areas of this record.  Accompanied visit:     (x) No.      Present condition:  With the switching to QULIPTA, the headaches are a lot better controlled and easily responsive the Ubrelvy Indomethacin combination    Denies side effects    Past History update/new problem(s): as above    Review of Systems:  Review of Systems:  Denies systemic symptoms     No CP or SOB.  No GI or  symptoms. Relevant Neuro as noted above.      Medications:      Current Outpatient Medications:     Atogepant (QULIPTA) 60 MG Oral Tab, Take 60 mg by mouth daily., Disp: 90 tablet, Rfl: 3    divalproex  MG Oral Tablet 24 Hr, Take 1 tablet (500 mg total) by mouth nightly., Disp: 180 tablet, Rfl: 3    indomethacin 50 MG Oral Cap, TAKE ONE CAPSULE BY MOUTH 1-2 TIMES DAILY AS NEEDED, Disp: 180 capsule, Rfl: 3    ondansetron (ZOFRAN) 4 mg tablet, Take 1 tablet (4 mg total) by mouth every 8 (eight) hours as needed for Nausea., Disp: 20 tablet, Rfl: 5    ubrogepant (UBRELVY) 100 MG Oral Tab, Take 100 mg by mouth as needed. MAY TAKE ADDITIONAL TABLET IN 2 HOURS IF NEEDED. DO NOT EXCEED TWO TABLETS PER 24 HOUR PERIOD, Disp: 10 tablet, Rfl: 11    Norethin Ace-Eth Estrad-FE (JUNEL FE 1/20) 1-20 MG-MCG Oral Tab, Take 1 tablet by mouth every morning., Disp: 84 tablet, Rfl: 2  PRN:     Allergies:  Allergies[1]       EXAM:  /77 (BP Location: Left arm, Patient Position: Sitting, Cuff Size: adult)   Pulse 60   Resp 16   Ht 63\"   Wt 136 lb (61.7 kg)   LMP 11/19/2024 (Exact Date)   BMI 24.09 kg/m²   Looks stated age  General Exam:  HENT:  pink conjunctiva anicteric  sclerae  Neck no adenopathy, thyroid normal  Heart and Lungs:  normal  Extremities: no cyanosis, skin changes    NEURO  NEURO  Able to relate events with fluent speech and intact comprehension  CN 2-12: pupils reactive, VF full face symmetric sensation and movement tongue midline  No motor focal findings  Sensory: no lateralizing findings  Reflexes are symmetric  UMN signs: none  Gait: narrow based          INTERPRETATION of RELEVANT LABS and other DATA:          Problem/s Identified this visit:   1. Migraine without aura and without status migrainosus, not intractable    2. Intractable episodic paroxysmal hemicrania    3. Nausea          Discussion plus Diagnostics & Treatment Orders:  Studies ordered:  No orders of the defined types were placed in this encounter.      MEDS  Orders Placed This Encounter    Atogepant (QULIPTA) 60 MG Oral Tab     Sig: Take 60 mg by mouth daily.     Dispense:  90 tablet     Refill:  3    divalproex  MG Oral Tablet 24 Hr     Sig: Take 1 tablet (500 mg total) by mouth nightly.     Dispense:  180 tablet     Refill:  3    indomethacin 50 MG Oral Cap     Sig: TAKE ONE CAPSULE BY MOUTH 1-2 TIMES DAILY AS NEEDED     Dispense:  180 capsule     Refill:  3    ondansetron (ZOFRAN) 4 mg tablet     Sig: Take 1 tablet (4 mg total) by mouth every 8 (eight) hours as needed for Nausea.     Dispense:  20 tablet     Refill:  5    ubrogepant (UBRELVY) 100 MG Oral Tab     Sig: Take 100 mg by mouth as needed. MAY TAKE ADDITIONAL TABLET IN 2 HOURS IF NEEDED. DO NOT EXCEED TWO TABLETS PER 24 HOUR PERIOD     Dispense:  10 tablet     Refill:  11           (x) Discussed potential side effects of any treatment relevant to above.  Includes explanation of tests as necessary.    Return in about 6 months (around 5/20/2025).      Patient understands that if needed, based on condition and or test results, follow up will be readjusted      Chilo Alvarez MD  Vascular & General Neurology  Director, [facility]  Sclerosis Program  [facility]  11/20/2024, Time completed 2:16 PM    Decision making:  ( x ) labs reviewed/ordered - 1  (  ) new diagnosis: - 1  ( x) Images & studies independently reviewed -non F2F  (  ) Case/studies discussed with other caregivers - -non F2F  (  ) Telephone time with patiern or authorized [facility] member--non F2F  ( x ) other records reviewed --non F2F including consultations  (  ) [facility] meetings - patient not present --non F2F  (  ) Independent Historian obtained    Non Face to Face CPT code 52201/14634 applies as documented above    PROCEDURE DONE     (   ) see notes        After visit, patient was escorted out and handed-off discharge process and instructions to the check out desk.  No additional issues relevant to visit were raised to staff at this time interval.        This document is to be interpreted as my current opinion regarding the case as of the stated date of service based on the information available to me at this time and may supersedes any prior opinion expressed either orally or in writing.  Services rendered are only within the scope of direct medical care  Sometimes, reports may have been prepared partially using a speech recognition software technology.  If a word or phrase is confusing or out of context, please do not hesitate to call for clarification.              [1] No Known Allergies

## 2024-11-20 NOTE — PATIENT INSTRUCTIONS
Refill policies:    Allow 2-3 business days for refills; controlled substances may take longer.  Contact your pharmacy at least 5 days prior to running out of medication and have them send an electronic request or submit request through the “request refill” option in your [employer] account.  Refills are not addressed on weekends; covering physicians do not authorize routine medications on weekends.  No narcotics or controlled substances are refilled after noon on Fridays or by on call physicians.  By law, narcotics must be electronically prescribed.  A 30 day supply with no refills is the maximum allowed.  If your prescription is due for a refill, you may be due for a follow up appointment.  To best provide you care, patients receiving routine medications need to be seen at least once a year.  Patients receiving narcotic/controlled substance medications need to be seen at least once every 3 months.  In the event that your preferred pharmacy does not have the requested medication in stock (e.g. Backordered), it is your responsibility to find another pharmacy that has the requested medication available.  We will gladly send a new prescription to that pharmacy at your request.    Scheduling Tests:    If your physician has ordered radiology tests such as MRI or CT scans, please contact Central Scheduling at [phone] right away to schedule the test.  Once scheduled, the [facility] Centralized Referral Team will work with your insurance carrier to obtain pre-certification or prior authorization.  Depending on your insurance carrier, approval may take 3-10 days.  It is highly recommended patients assure they have received an authorization before having a test performed.  If test is done without insurance authorization, patient may be responsible for the entire amount billed.      Precertification and Prior Authorizations:  If your physician has recommended that you have a procedure or additional testing performed the [facility]  Centralized Referral Team will contact your insurance carrier to obtain pre-certification or prior authorization.    You are strongly encouraged to contact your insurance carrier to verify that your procedure/test has been approved and is a COVERED benefit.  Although the [facility] Centralized Referral Team does its due diligence, the insurance carrier gives the disclaimer that \"Although the procedure is authorized, this does not guarantee payment.\"    Ultimately the patient is responsible for payment.   Thank you for your understanding in this matter.  Paperwork Completion:  If you require FMLA or disability paperwork for your recovery, please make sure to either drop it off or have it faxed to our office at [phone]. Be sure the form has your name and date of birth on it.  The form will be faxed to our Forms Department and they will complete it for you.  There is a 25$ fee for all forms that need to be filled out.  Please be aware there is a 10-14 day turnaround time.  You will need to sign a release of information (MIRANDA) form if your paperwork does not come with one.  You may call the Forms Department with any questions at [phone].  Their fax number is [phone].

## 2024-11-26 ENCOUNTER — OFFICE VISIT (OUTPATIENT)
Dept: FAMILY MEDICINE CLINIC | Facility: CLINIC | Age: 24
End: 2024-11-26
Payer: COMMERCIAL

## 2024-11-26 VITALS
TEMPERATURE: 98 F | WEIGHT: 117.25 LBS | HEIGHT: 63 IN | OXYGEN SATURATION: 100 % | SYSTOLIC BLOOD PRESSURE: 108 MMHG | DIASTOLIC BLOOD PRESSURE: 60 MMHG | HEART RATE: 87 BPM | BODY MASS INDEX: 20.77 KG/M2 | RESPIRATION RATE: 16 BRPM

## 2024-11-26 DIAGNOSIS — Z00.00 WELL ADULT EXAM: Primary | ICD-10-CM

## 2024-11-26 PROCEDURE — 88175 CYTOPATH C/V AUTO FLUID REDO: CPT | Performed by: INTERNAL MEDICINE

## 2024-11-26 PROCEDURE — 87624 HPV HI-RISK TYP POOLED RSLT: CPT | Performed by: INTERNAL MEDICINE

## 2024-11-26 NOTE — PROGRESS NOTES
HPI:   Casandra Curtis is a 24 year old female who presents for a complete physical exam. Symptoms: denies discharge, itching, burning or dysuria, periods are regular. Patient complains of stress and anxiety.  Sleep is OK, a little oversleeping. .She does YOGA 3-4 x a week.       Immunization History   Administered Date(s) Administered    Covid-19 Vaccine Moderna 100 mcg/0.5 ml 01/04/2021, 02/01/2021     Wt Readings from Last 6 Encounters:   11/26/24 117 lb 4 oz (53.2 kg)   11/20/24 136 lb (61.7 kg)   05/15/24 136 lb (61.7 kg)   11/18/23 136 lb (61.7 kg)   11/15/23 132 lb (59.9 kg)   11/16/22 132 lb (59.9 kg)     Body mass index is 20.77 kg/m².     Lab Results   Component Value Date    GLU 79 06/14/2019     No results found for: \"CHOLEST\"  No results found for: \"HDL\"  No results found for: \"LDL\"  Lab Results   Component Value Date    AST 15 06/14/2019     Lab Results   Component Value Date    ALT 24 06/14/2019       Current Outpatient Medications   Medication Sig Dispense Refill    Atogepant (QULIPTA) 60 MG Oral Tab Take 60 mg by mouth daily. 90 tablet 3    indomethacin 50 MG Oral Cap TAKE ONE CAPSULE BY MOUTH 1-2 TIMES DAILY AS NEEDED 180 capsule 3    ondansetron (ZOFRAN) 4 mg tablet Take 1 tablet (4 mg total) by mouth every 8 (eight) hours as needed for Nausea. 20 tablet 5    ubrogepant (UBRELVY) 100 MG Oral Tab Take 100 mg by mouth as needed. MAY TAKE ADDITIONAL TABLET IN 2 HOURS IF NEEDED. DO NOT EXCEED TWO TABLETS PER 24 HOUR PERIOD 10 tablet 11    Norethin Ace-Eth Estrad-FE (JUNEL FE 1/20) 1-20 MG-MCG Oral Tab Take 1 tablet by mouth every morning. 84 tablet 2      Past Medical History:    Anxiety      No past surgical history on file.   Family History   Problem Relation Age of Onset    Migraines Father     Hypertension Father     Other (lichen sclerosis et atrophicus) Mother     No Known Problems Brother       Social History:   Social History     Socioeconomic History    Marital status:    Tobacco  Use    Smoking status: Never     Passive exposure: Never    Smokeless tobacco: Never   Vaping Use    Vaping status: Never Used   Substance and Sexual Activity    Alcohol use: Yes     Alcohol/week: 2.0 standard drinks of alcohol     Types: 2 Standard drinks or equivalent per week    Drug use: Never   Other Topics Concern    Caffeine Concern Yes     Comment: coffee every other day    Exercise Yes     Comment: 3-4 x week    Seat Belt Yes    Special Diet No    Weight Concern No     Occ: therapist. : yes. Children: no.   Exercise:  YOGA .  Diet: watches minimally     REVIEW OF SYSTEMS:   GENERAL: feels well otherwise  SKIN: denies any unusual skin lesions  EYES:denies blurred vision or double vision  HEENT: denies nasal congestion, sinus pain or ST  LUNGS: denies shortness of breath with exertion  CARDIOVASCULAR: denies chest pain on exertion  GI: denies abdominal pain,denies heartburn  : denies dysuria, vaginal discharge or itching,periods regular   MUSCULOSKELETAL: denies back pain  NEURO: denies headaches  PSYCHE: denies depression or anxiety  HEMATOLOGIC: denies hx of anemia  ENDOCRINE: denies thyroid history  ALL/ASTHMA: denies hx of allergy or asthma    EXAM:   /60   Pulse 87   Temp 97.7 °F (36.5 °C) (Temporal)   Resp 16   Ht 5' 3\" (1.6 m)   Wt 117 lb 4 oz (53.2 kg)   LMP 11/19/2024 (Exact Date)   SpO2 100%   BMI 20.77 kg/m²   Body mass index is 20.77 kg/m².   GENERAL: well developed, well nourished,in no apparent distress  SKIN: no rashes,no suspicious lesions  HEENT: atraumatic, normocephalic,ears and throat are clear  EYES:PERRLA, EOMI, normal optic disk,conjunctiva are clear  NECK: supple,no adenopathy,no bruits  CHEST: no chest tenderness  BREAST: no dominant or suspicious mass  LUNGS: clear to auscultation  CARDIO: RRR without murmur  GI: good BS's,no masses, HSM or tenderness  :introitus is normal,scant discharge,cervix is pink,no adnexal masses or tenderness, PAP was done    RECTAL:good rectal tone, stool is OB negative  MUSCULOSKELETAL: back is not tender,FROM of the back  EXTREMITIES: no cyanosis, clubbing or edema  NEURO: Oriented times three,cranial nerves are intact,motor and sensory are grossly intact    ASSESSMENT AND PLAN:   Casandra Curtis is a 24 year old female who presents for a complete physical exam.  Pap and pelvic done. Self breast exam explained. Pt referred for screening colonoscopy. Pt info handouts given for: exercise, low fat diet and breast self-exam. Pt' s weight is Body mass index is 20.77 kg/m²., recommended low fat diet and aerobic exercise 30 minutes three times weekly.  The patient indicates understanding of these issues and agrees to the plan.  The patient is asked to return for CPX in 1 year.

## 2024-12-05 LAB
.: NORMAL
.: NORMAL

## 2024-12-06 LAB — HPV E6+E7 MRNA CVX QL NAA+PROBE: NEGATIVE

## 2025-03-14 ENCOUNTER — PATIENT MESSAGE (OUTPATIENT)
Dept: NEUROLOGY | Facility: CLINIC | Age: 25
End: 2025-03-14

## 2025-03-14 DIAGNOSIS — G43.009 MIGRAINE WITHOUT AURA AND WITHOUT STATUS MIGRAINOSUS, NOT INTRACTABLE: Primary | ICD-10-CM

## 2025-03-14 RX ORDER — PREDNISONE 10 MG/1
TABLET ORAL
Qty: 30 TABLET | Refills: 0 | Status: SHIPPED | OUTPATIENT
Start: 2025-03-14

## 2025-03-14 NOTE — TELEPHONE ENCOUNTER
S: Increased migraine    B: On Qulipta 60 mg daily, uses indomethacin + ubrelvy for abortive treatment    A: Reports migraines that don't respond to her abortive treatment this week. She has previously used prednisone 10 mg, 15-day tapering schedule in the past with good results (last given 11/2023). Due to her location, she is unable to present to the office for injections.    R: Per Dr. Antonio LITTLE to give prednisone. Script sent as directed.

## 2025-05-14 RX ORDER — INDOMETHACIN 50 MG/1
CAPSULE ORAL
Qty: 180 CAPSULE | Refills: 2 | Status: SHIPPED | OUTPATIENT
Start: 2025-05-14

## 2025-05-15 ENCOUNTER — PATIENT MESSAGE (OUTPATIENT)
Dept: FAMILY MEDICINE CLINIC | Facility: CLINIC | Age: 25
End: 2025-05-15

## 2025-05-21 ENCOUNTER — OFFICE VISIT (OUTPATIENT)
Dept: NEUROLOGY | Facility: CLINIC | Age: 25
End: 2025-05-21
Payer: COMMERCIAL

## 2025-05-21 VITALS
RESPIRATION RATE: 16 BRPM | SYSTOLIC BLOOD PRESSURE: 124 MMHG | DIASTOLIC BLOOD PRESSURE: 84 MMHG | BODY MASS INDEX: 20.73 KG/M2 | HEART RATE: 64 BPM | WEIGHT: 117 LBS | HEIGHT: 63 IN

## 2025-05-21 DIAGNOSIS — G44.031 INTRACTABLE EPISODIC PAROXYSMAL HEMICRANIA: Primary | ICD-10-CM

## 2025-05-21 DIAGNOSIS — M79.18 CERVICAL MYOFASCIAL PAIN SYNDROME: ICD-10-CM

## 2025-05-21 DIAGNOSIS — G43.009 MIGRAINE WITHOUT AURA AND WITHOUT STATUS MIGRAINOSUS, NOT INTRACTABLE: ICD-10-CM

## 2025-05-21 PROCEDURE — 3074F SYST BP LT 130 MM HG: CPT | Performed by: OTHER

## 2025-05-21 PROCEDURE — 3008F BODY MASS INDEX DOCD: CPT | Performed by: OTHER

## 2025-05-21 PROCEDURE — 99214 OFFICE O/P EST MOD 30 MIN: CPT | Performed by: OTHER

## 2025-05-21 PROCEDURE — 3079F DIAST BP 80-89 MM HG: CPT | Performed by: OTHER

## 2025-05-21 RX ORDER — ESCITALOPRAM OXALATE 5 MG/1
5 TABLET ORAL DAILY
COMMUNITY
Start: 2025-05-16

## 2025-05-21 RX ORDER — HYDROXYZINE PAMOATE 25 MG/1
25 CAPSULE ORAL 3 TIMES DAILY PRN
COMMUNITY
Start: 2025-05-16

## 2025-05-21 NOTE — PATIENT INSTRUCTIONS
Refill policies:    Allow 2-3 business days for refills; controlled substances may take longer.  Contact your pharmacy at least 5 days prior to running out of medication and have them send an electronic request or submit request through the “request refill” option in your [employer] account.  Refills are not addressed on weekends; covering physicians do not authorize routine medications on weekends.  No narcotics or controlled substances are refilled after noon on Fridays or by on call physicians.  By law, narcotics must be electronically prescribed.  A 30 day supply with no refills is the maximum allowed.  If your prescription is due for a refill, you may be due for a follow up appointment.  To best provide you care, patients receiving routine medications need to be seen at least once a year.  Patients receiving narcotic/controlled substance medications need to be seen at least once every 3 months.  In the event that your preferred pharmacy does not have the requested medication in stock (e.g. Backordered), it is your responsibility to find another pharmacy that has the requested medication available.  We will gladly send a new prescription to that pharmacy at your request.    Scheduling Tests:    If your physician has ordered radiology tests such as MRI or CT scans, please contact Central Scheduling at [phone] right away to schedule the test.  Once scheduled, the [facility] Centralized Referral Team will work with your insurance carrier to obtain pre-certification or prior authorization.  Depending on your insurance carrier, approval may take 3-10 days.  It is highly recommended patients assure they have received an authorization before having a test performed.  If test is done without insurance authorization, patient may be responsible for the entire amount billed.      Precertification and Prior Authorizations:  If your physician has recommended that you have a procedure or additional testing performed the [facility]  Centralized Referral Team will contact your insurance carrier to obtain pre-certification or prior authorization.    You are strongly encouraged to contact your insurance carrier to verify that your procedure/test has been approved and is a COVERED benefit.  Although the [facility] Centralized Referral Team does its due diligence, the insurance carrier gives the disclaimer that \"Although the procedure is authorized, this does not guarantee payment.\"    Ultimately the patient is responsible for payment.   Thank you for your understanding in this matter.  Paperwork Completion:  If you require FMLA or disability paperwork for your recovery, please make sure to either drop it off or have it faxed to our office at [phone]. Be sure the form has your name and date of birth on it.  The form will be faxed to our Forms Department and they will complete it for you.  There is a 25$ fee for all forms that need to be filled out.  Please be aware there is a 10-14 day turnaround time.  You will need to sign a release of information (MIRANDA) form if your paperwork does not come with one.  You may call the Forms Department with any questions at [phone].  Their fax number is [phone].

## 2025-05-21 NOTE — PROGRESS NOTES
NEUROLOGY  [facility]       Casandra Curtis  1/30/2000  Primary Care Provider:  Maryan Etienne MD    5/21/2025  25 year old yo,  was last seen on:: November 2024    Seen for/plans last visit:  TAC and migraines    Previous visit and existing record notes reviewed in preparation for the face to face visit.  Relevant labs and studies reviewed and will be noted in relevant areas of this record.  Accompanied visit:     (x) No.      Present condition:  Except for a period of increasing headaches end of March and April where we gave her prednisone which quieted down the headaches, now she is back to doing better again.    Indomethacin with Ubrelvy helps significantly.  She gets neck pains all the time and has been to chiropractors    Past History update/new problem(s): as above    Review of Systems:  Review of Systems:  Denies systemic symptoms     No CP or SOB.  No GI or  symptoms. Relevant Neuro as noted above.      Medications:    Medications - Current[1]  PRN: PRN Medications[2]    Allergies:  Allergies[3]       EXAM:  /84 (BP Location: Left arm, Patient Position: Sitting, Cuff Size: adult)   Pulse 64   Resp 16   Ht 63\"   Wt 117 lb (53.1 kg)   LMP 05/07/2025 (Exact Date)   BMI 20.73 kg/m²   Looks stated age  General Exam:  HENT:  pink conjunctiva anicteric sclerae  Neck no adenopathy, thyroid normal  Heart and Lungs:  normal  Extremities: no cyanosis, skin changes    NEURO  NEURO  Able to relate events with fluent speech and intact comprehension  CN 2-12: pupils reactive, VF full face symmetric sensation and movement tongue midline  No motor focal findings  Sensory: no lateralizing findings  Reflexes are symmetric  UMN signs: none  Gait: narrow based          INTERPRETATION of RELEVANT LABS and other DATA:          Problem/s Identified this visit:   1. Intractable episodic paroxysmal hemicrania    2. Migraine without aura and without status migrainosus, not intractable    3.  Cervical myofascial pain syndrome          Discussion plus Diagnostics & Treatment Orders:  Will keep on the same regimen of Qulipta and ubrelvy indomethacin abortive treatment    If HA starts breaking through again then we can get BOTOX authorized      (x) Discussed potential side effects of any treatment relevant to above.  Includes explanation of tests as necessary.    Return in about 6 months (around 11/21/2025).      Patient understands that if needed, based on condition and or test results, follow up will be readjusted      Chilo Alvarez MD  Vascular & General Neurology  Director, Multiple Sclerosis Program  [facility]  5/21/2025, Time completed 2:26 PM    Decision making:  ( x ) labs reviewed/ordered - 1  (  ) new diagnosis: - 1  ( x) Images & studies independently reviewed -non F2F  (  ) Case/studies discussed with other caregivers - -non F2F  (  ) Telephone time with patiern or authorized Fam member--non F2F  ( x ) other records reviewed --non F2F including consultations  (  ) [facility] meetings - patient not present --non F2F  (  ) Independent Historian obtained    Non Face to Face CPT code 94582/76306 applies as documented above    PROCEDURE DONE     (   ) see notes        After visit, patient was escorted out and handed-off discharge process and instructions to the check out desk.  No additional issues relevant to visit were raised to staff at this time interval.        This document is to be interpreted as my current opinion regarding the case as of the stated date of service based on the information available to me at this time and may supersedes any prior opinion expressed either orally or in writing.  Services rendered are only within the scope of direct medical care  Sometimes, reports may have been prepared partially using a speech recognition software technology.  If a word or phrase is confusing or out of context, please do not hesitate to call for clarification.              [1]    Current Outpatient Medications:     escitalopram 5 MG Oral Tab, Take 1 tablet (5 mg total) by mouth daily., Disp: , Rfl:     hydrOXYzine Pamoate 25 MG Oral Cap, Take 1 capsule (25 mg total) by mouth 3 (three) times daily as needed for Anxiety., Disp: , Rfl:     indomethacin 50 MG Oral Cap, TAKE ONE CAPSULE BY MOUTH 1-2 TIMES DAILY AS NEEDED, Disp: 180 capsule, Rfl: 2    predniSONE 10 MG Oral Tab, 3 tab daily for 5 days, then 2 tab daily for 5 days then 1 tab daily for 5 days then stop, Disp: 30 tablet, Rfl: 0    Atogepant (QULIPTA) 60 MG Oral Tab, Take 60 mg by mouth daily., Disp: 90 tablet, Rfl: 3    ondansetron (ZOFRAN) 4 mg tablet, Take 1 tablet (4 mg total) by mouth every 8 (eight) hours as needed for Nausea., Disp: 20 tablet, Rfl: 5    ubrogepant (UBRELVY) 100 MG Oral Tab, Take 100 mg by mouth as needed. MAY TAKE ADDITIONAL TABLET IN 2 HOURS IF NEEDED. DO NOT EXCEED TWO TABLETS PER 24 HOUR PERIOD, Disp: 10 tablet, Rfl: 11    Norethin Ace-Eth Estrad-FE (JUNEL FE 1/20) 1-20 MG-MCG Oral Tab, Take 1 tablet by mouth every morning., Disp: 84 tablet, Rfl: 2  [2] [3] No Known Allergies

## 2025-06-04 ENCOUNTER — TELEPHONE (OUTPATIENT)
Dept: NEUROLOGY | Facility: CLINIC | Age: 25
End: 2025-06-04

## 2025-06-04 NOTE — TELEPHONE ENCOUNTER
Faxed received from [employer], prescription drug approval. Placed in nurses bin for review.

## 2025-06-05 NOTE — TELEPHONE ENCOUNTER
Received fax from [employer]   Approval received for patient use of Qulipta   Approval granted: May 5, 2025 to June 4, 2026         Pt notified

## 2025-08-04 ENCOUNTER — PATIENT MESSAGE (OUTPATIENT)
Dept: NEUROLOGY | Facility: CLINIC | Age: 25
End: 2025-08-04

## 2025-08-04 DIAGNOSIS — G43.009 MIGRAINE WITHOUT AURA AND WITHOUT STATUS MIGRAINOSUS, NOT INTRACTABLE: ICD-10-CM

## 2025-08-05 ENCOUNTER — TELEPHONE (OUTPATIENT)
Dept: NEUROLOGY | Facility: CLINIC | Age: 25
End: 2025-08-05

## 2025-08-05 DIAGNOSIS — G43.009 MIGRAINE WITHOUT AURA AND WITHOUT STATUS MIGRAINOSUS, NOT INTRACTABLE: ICD-10-CM

## 2025-08-05 DIAGNOSIS — G44.031 INTRACTABLE EPISODIC PAROXYSMAL HEMICRANIA: Primary | ICD-10-CM

## 2025-08-06 RX ORDER — PREDNISONE 10 MG/1
TABLET ORAL
Qty: 30 TABLET | Refills: 0 | Status: SHIPPED | OUTPATIENT
Start: 2025-08-06

## 2025-08-25 RX ORDER — NORETHINDRONE ACETATE AND ETHINYL ESTRADIOL 1MG-20(21)
1 KIT ORAL EVERY MORNING
Qty: 84 TABLET | Refills: 2 | Status: SHIPPED | OUTPATIENT
Start: 2025-08-25

## (undated) DIAGNOSIS — G44.031 INTRACTABLE EPISODIC PAROXYSMAL HEMICRANIA: ICD-10-CM

## (undated) DIAGNOSIS — G43.009 MIGRAINE WITHOUT AURA AND WITHOUT STATUS MIGRAINOSUS, NOT INTRACTABLE: ICD-10-CM

## (undated) NOTE — MR AVS SNAPSHOT
After Visit Summary   9/22/2021    Seema Lamb   MRN: [id number]           Visit Information     Date & Time  9/22/2021  2:30 PM Provider  Yohana Irene MD [facility] [address] [facility].  Phone  [phone] Video Visits cost $50 and can be paid hassle-free using a credit, debit, or health savings card. Not active on [employer]? Ask us how to get signed up today!           If you receive a survey from [email or web address], please take a few minutes to complete it and prov